# Patient Record
Sex: FEMALE | Race: WHITE | HISPANIC OR LATINO | Employment: OTHER | URBAN - METROPOLITAN AREA
[De-identification: names, ages, dates, MRNs, and addresses within clinical notes are randomized per-mention and may not be internally consistent; named-entity substitution may affect disease eponyms.]

---

## 2017-01-26 ENCOUNTER — TELEPHONE (OUTPATIENT)
Dept: SURGERY | Facility: CLINIC | Age: 82
End: 2017-01-26

## 2017-01-26 NOTE — TELEPHONE ENCOUNTER
----- Message from Bernarda Galarza sent at 1/25/2017 11:30 AM CST -----  Contact: Aditi from [facility] [phone] ext [phone]  Pt states she been vomiting for 2 days, would like to speak with nurse and would like to be seen soon. Please call Aditi

## 2017-01-26 NOTE — TELEPHONE ENCOUNTER
Attempted to reach Aditi at [facility] twice. Messages left on 1/15 & 1/26. No return call.

## 2017-01-27 ENCOUNTER — TELEPHONE (OUTPATIENT)
Dept: SURGERY | Facility: CLINIC | Age: 82
End: 2017-01-27

## 2017-01-27 NOTE — TELEPHONE ENCOUNTER
Returned call to [facility]. Aditi has left for the day. Spoke with Amy Samuels LPN. Amy states she has reviewed the chart and is not sure what Ms. Lal needs. Asked to speak to nursing supervisor-none available.

## 2017-01-27 NOTE — TELEPHONE ENCOUNTER
----- Message from Jackelyn Galindo sent at 1/27/2017  9:44 AM CST -----  Contact: Aditi with [facility]#[phone] ext [phone]  Xray and nothing found but pt is vomiting 2 -3 times a day. She wants to sooner appt than 2/07 which is the first available. Please call

## 2017-10-27 ENCOUNTER — HOSPITAL ENCOUNTER (INPATIENT)
Facility: HOSPITAL | Age: 82
LOS: 4 days | Discharge: NURSING, REHAB OR LONG-TERM CARE | DRG: 304 | End: 2017-10-31
Attending: EMERGENCY MEDICINE | Admitting: INTERNAL MEDICINE
Payer: MEDICARE

## 2017-10-27 DIAGNOSIS — N18.9 ACUTE KIDNEY INJURY SUPERIMPOSED ON CKD: ICD-10-CM

## 2017-10-27 DIAGNOSIS — I16.1 HYPERTENSIVE EMERGENCY: Primary | ICD-10-CM

## 2017-10-27 DIAGNOSIS — I50.9 CHF (CONGESTIVE HEART FAILURE): ICD-10-CM

## 2017-10-27 DIAGNOSIS — N18.5 CKD (CHRONIC KIDNEY DISEASE) STAGE 5, GFR LESS THAN 15 ML/MIN: ICD-10-CM

## 2017-10-27 DIAGNOSIS — N17.9 ACUTE KIDNEY INJURY SUPERIMPOSED ON CKD: ICD-10-CM

## 2017-10-27 DIAGNOSIS — I10 HTN (HYPERTENSION): ICD-10-CM

## 2017-10-27 DIAGNOSIS — R80.8 OTHER PROTEINURIA: ICD-10-CM

## 2017-10-27 PROBLEM — N18.4 CKD STAGE 4 SECONDARY TO HYPERTENSION: Status: ACTIVE | Noted: 2017-10-27

## 2017-10-27 PROBLEM — R79.89 ELEVATED BRAIN NATRIURETIC PEPTIDE (BNP) LEVEL: Status: ACTIVE | Noted: 2017-10-27

## 2017-10-27 PROBLEM — D63.8 ANEMIA OF CHRONIC DISEASE: Status: ACTIVE | Noted: 2017-10-27

## 2017-10-27 PROBLEM — J30.2 SEASONAL ALLERGIES: Status: ACTIVE | Noted: 2017-10-27

## 2017-10-27 PROBLEM — I12.9 CKD STAGE 4 SECONDARY TO HYPERTENSION: Status: ACTIVE | Noted: 2017-10-27

## 2017-10-27 PROBLEM — R32 URINARY INCONTINENCE: Status: ACTIVE | Noted: 2017-10-27

## 2017-10-27 PROBLEM — F32.A DEPRESSION: Status: ACTIVE | Noted: 2017-10-27

## 2017-10-27 PROBLEM — I15.0 RENOVASCULAR HYPERTENSION: Status: ACTIVE | Noted: 2017-10-27

## 2017-10-27 PROBLEM — F03.90 DEMENTIA WITHOUT BEHAVIORAL DISTURBANCE: Status: ACTIVE | Noted: 2017-10-27

## 2017-10-27 PROBLEM — R51.9 HEADACHE: Status: ACTIVE | Noted: 2017-10-27

## 2017-10-27 PROBLEM — Z93.3 COLOSTOMY IN PLACE: Status: ACTIVE | Noted: 2017-10-27

## 2017-10-27 LAB
ALBUMIN SERPL BCP-MCNC: 2.5 G/DL
ALP SERPL-CCNC: 69 U/L
ALT SERPL W/O P-5'-P-CCNC: 8 U/L
ANION GAP SERPL CALC-SCNC: 7 MMOL/L
AST SERPL-CCNC: 14 U/L
BASOPHILS # BLD AUTO: 0.04 K/UL
BASOPHILS NFR BLD: 0.5 %
BILIRUB SERPL-MCNC: 0.3 MG/DL
BNP SERPL-MCNC: 484 PG/ML
BUN SERPL-MCNC: 32 MG/DL
CALCIUM SERPL-MCNC: 8.4 MG/DL
CHLORIDE SERPL-SCNC: 105 MMOL/L
CO2 SERPL-SCNC: 26 MMOL/L
CREAT SERPL-MCNC: 3.3 MG/DL
CREAT UR-MCNC: 41 MG/DL
CREAT UR-MCNC: 41 MG/DL
DIFFERENTIAL METHOD: ABNORMAL
EOSINOPHIL # BLD AUTO: 0.3 K/UL
EOSINOPHIL NFR BLD: 3.3 %
ERYTHROCYTE [DISTWIDTH] IN BLOOD BY AUTOMATED COUNT: 14.6 %
EST. GFR  (AFRICAN AMERICAN): 13.6 ML/MIN/1.73 M^2
EST. GFR  (NON AFRICAN AMERICAN): 11.8 ML/MIN/1.73 M^2
GLUCOSE SERPL-MCNC: 91 MG/DL
HCT VFR BLD AUTO: 37.4 %
HGB BLD-MCNC: 11.6 G/DL
IMM GRANULOCYTES # BLD AUTO: 0.04 K/UL
IMM GRANULOCYTES NFR BLD AUTO: 0.5 %
LYMPHOCYTES # BLD AUTO: 1.5 K/UL
LYMPHOCYTES NFR BLD: 16.6 %
MAGNESIUM SERPL-MCNC: 2.6 MG/DL
MCH RBC QN AUTO: 27.6 PG
MCHC RBC AUTO-ENTMCNC: 31 G/DL
MCV RBC AUTO: 89 FL
MONOCYTES # BLD AUTO: 1.1 K/UL
MONOCYTES NFR BLD: 12.2 %
NEUTROPHILS # BLD AUTO: 5.9 K/UL
NEUTROPHILS NFR BLD: 66.9 %
NRBC BLD-RTO: 0 /100 WBC
PHOSPHATE SERPL-MCNC: 3.9 MG/DL
PLATELET # BLD AUTO: 343 K/UL
PMV BLD AUTO: 10.3 FL
POTASSIUM SERPL-SCNC: 3.8 MMOL/L
PROT SERPL-MCNC: 6.7 G/DL
PROT UR-MCNC: 495 MG/DL
PROT/CREAT RATIO, UR: 12.07
RBC # BLD AUTO: 4.21 M/UL
SODIUM SERPL-SCNC: 138 MMOL/L
SODIUM UR-SCNC: 93 MMOL/L
TROPONIN I SERPL DL<=0.01 NG/ML-MCNC: 0.02 NG/ML
TROPONIN I SERPL DL<=0.01 NG/ML-MCNC: 0.03 NG/ML
UUN UR-MCNC: 249 MG/DL
WBC # BLD AUTO: 8.86 K/UL

## 2017-10-27 PROCEDURE — 20000000 HC ICU ROOM

## 2017-10-27 PROCEDURE — 84300 ASSAY OF URINE SODIUM: CPT

## 2017-10-27 PROCEDURE — 25000003 PHARM REV CODE 250: Performed by: STUDENT IN AN ORGANIZED HEALTH CARE EDUCATION/TRAINING PROGRAM

## 2017-10-27 PROCEDURE — 63600175 PHARM REV CODE 636 W HCPCS: Performed by: STUDENT IN AN ORGANIZED HEALTH CARE EDUCATION/TRAINING PROGRAM

## 2017-10-27 PROCEDURE — 84100 ASSAY OF PHOSPHORUS: CPT

## 2017-10-27 PROCEDURE — 93005 ELECTROCARDIOGRAM TRACING: CPT

## 2017-10-27 PROCEDURE — 25000003 PHARM REV CODE 250: Performed by: INTERNAL MEDICINE

## 2017-10-27 PROCEDURE — 63600175 PHARM REV CODE 636 W HCPCS: Performed by: INTERNAL MEDICINE

## 2017-10-27 PROCEDURE — 99285 EMERGENCY DEPT VISIT HI MDM: CPT | Mod: 25

## 2017-10-27 PROCEDURE — 83880 ASSAY OF NATRIURETIC PEPTIDE: CPT

## 2017-10-27 PROCEDURE — 80053 COMPREHEN METABOLIC PANEL: CPT

## 2017-10-27 PROCEDURE — 93010 ELECTROCARDIOGRAM REPORT: CPT | Mod: ,,, | Performed by: INTERNAL MEDICINE

## 2017-10-27 PROCEDURE — 83735 ASSAY OF MAGNESIUM: CPT

## 2017-10-27 PROCEDURE — 99223 1ST HOSP IP/OBS HIGH 75: CPT | Mod: AI,GC,, | Performed by: INTERNAL MEDICINE

## 2017-10-27 PROCEDURE — 96366 THER/PROPH/DIAG IV INF ADDON: CPT

## 2017-10-27 PROCEDURE — 84156 ASSAY OF PROTEIN URINE: CPT

## 2017-10-27 PROCEDURE — 99285 EMERGENCY DEPT VISIT HI MDM: CPT | Mod: ,,, | Performed by: EMERGENCY MEDICINE

## 2017-10-27 PROCEDURE — 84540 ASSAY OF URINE/UREA-N: CPT

## 2017-10-27 PROCEDURE — 96375 TX/PRO/DX INJ NEW DRUG ADDON: CPT

## 2017-10-27 PROCEDURE — 12000002 HC ACUTE/MED SURGE SEMI-PRIVATE ROOM

## 2017-10-27 PROCEDURE — 96365 THER/PROPH/DIAG IV INF INIT: CPT

## 2017-10-27 PROCEDURE — 85025 COMPLETE CBC W/AUTO DIFF WBC: CPT

## 2017-10-27 PROCEDURE — 84484 ASSAY OF TROPONIN QUANT: CPT | Mod: 91

## 2017-10-27 PROCEDURE — 96372 THER/PROPH/DIAG INJ SC/IM: CPT

## 2017-10-27 PROCEDURE — 84484 ASSAY OF TROPONIN QUANT: CPT

## 2017-10-27 RX ORDER — CALCITRIOL 0.25 UG/1
0.5 CAPSULE ORAL DAILY
Status: DISCONTINUED | OUTPATIENT
Start: 2017-10-28 | End: 2017-10-31 | Stop reason: HOSPADM

## 2017-10-27 RX ORDER — FOLIC ACID 1 MG/1
1 TABLET ORAL DAILY
COMMUNITY

## 2017-10-27 RX ORDER — DONEPEZIL HYDROCHLORIDE 10 MG/1
10 TABLET, FILM COATED ORAL NIGHTLY
COMMUNITY

## 2017-10-27 RX ORDER — NEBIVOLOL 10 MG/1
10 TABLET ORAL DAILY
Status: DISCONTINUED | OUTPATIENT
Start: 2017-10-28 | End: 2017-10-28

## 2017-10-27 RX ORDER — TORSEMIDE 20 MG/1
20 TABLET ORAL 2 TIMES DAILY
Status: DISCONTINUED | OUTPATIENT
Start: 2017-10-27 | End: 2017-10-27

## 2017-10-27 RX ORDER — HEPARIN SODIUM 5000 [USP'U]/ML
5000 INJECTION, SOLUTION INTRAVENOUS; SUBCUTANEOUS EVERY 8 HOURS
Status: DISCONTINUED | OUTPATIENT
Start: 2017-10-27 | End: 2017-10-31 | Stop reason: HOSPADM

## 2017-10-27 RX ORDER — MECLIZINE HCL 12.5 MG 12.5 MG/1
12.5 TABLET ORAL 3 TIMES DAILY PRN
Status: DISCONTINUED | OUTPATIENT
Start: 2017-10-27 | End: 2017-10-31 | Stop reason: HOSPADM

## 2017-10-27 RX ORDER — SODIUM CHLORIDE 0.9 % (FLUSH) 0.9 %
3 SYRINGE (ML) INJECTION EVERY 8 HOURS
Status: DISCONTINUED | OUTPATIENT
Start: 2017-10-27 | End: 2017-10-31 | Stop reason: HOSPADM

## 2017-10-27 RX ORDER — POTASSIUM CHLORIDE 20 MEQ/15ML
60 SOLUTION ORAL
Status: DISCONTINUED | OUTPATIENT
Start: 2017-10-27 | End: 2017-10-28

## 2017-10-27 RX ORDER — MECLIZINE HYDROCHLORIDE CHEWABLE TABLETS 25 MG/1
12.5 TABLET, CHEWABLE ORAL 3 TIMES DAILY PRN
Status: DISCONTINUED | OUTPATIENT
Start: 2017-10-27 | End: 2017-10-27

## 2017-10-27 RX ORDER — NICARDIPINE HYDROCHLORIDE 0.2 MG/ML
5 INJECTION INTRAVENOUS CONTINUOUS
Status: DISCONTINUED | OUTPATIENT
Start: 2017-10-27 | End: 2017-10-28

## 2017-10-27 RX ORDER — TORSEMIDE 20 MG/1
20 TABLET ORAL DAILY
Status: ON HOLD | COMMUNITY
End: 2017-10-31 | Stop reason: HOSPADM

## 2017-10-27 RX ORDER — AMLODIPINE BESYLATE 5 MG/1
5 TABLET ORAL DAILY
Status: DISCONTINUED | OUTPATIENT
Start: 2017-10-27 | End: 2017-10-28

## 2017-10-27 RX ORDER — ONDANSETRON 2 MG/ML
4 INJECTION INTRAMUSCULAR; INTRAVENOUS
Status: COMPLETED | OUTPATIENT
Start: 2017-10-27 | End: 2017-10-27

## 2017-10-27 RX ORDER — HYDRALAZINE HYDROCHLORIDE 25 MG/1
50 TABLET, FILM COATED ORAL ONCE
Status: COMPLETED | OUTPATIENT
Start: 2017-10-27 | End: 2017-10-27

## 2017-10-27 RX ORDER — IPRATROPIUM BROMIDE AND ALBUTEROL SULFATE 2.5; .5 MG/3ML; MG/3ML
3 SOLUTION RESPIRATORY (INHALATION) EVERY 4 HOURS PRN
Status: DISCONTINUED | OUTPATIENT
Start: 2017-10-27 | End: 2017-10-31 | Stop reason: HOSPADM

## 2017-10-27 RX ORDER — FERROUS GLUCONATE 324(38)MG
324 TABLET ORAL
Status: DISCONTINUED | OUTPATIENT
Start: 2017-10-28 | End: 2017-10-31 | Stop reason: HOSPADM

## 2017-10-27 RX ORDER — DONEPEZIL HYDROCHLORIDE 5 MG/1
10 TABLET, FILM COATED ORAL NIGHTLY
Status: DISCONTINUED | OUTPATIENT
Start: 2017-10-27 | End: 2017-10-31 | Stop reason: HOSPADM

## 2017-10-27 RX ORDER — TRIAMCINOLONE ACETONIDE 1 MG/G
CREAM TOPICAL 2 TIMES DAILY
COMMUNITY

## 2017-10-27 RX ORDER — NEBIVOLOL 10 MG/1
10 TABLET ORAL DAILY
Status: DISCONTINUED | OUTPATIENT
Start: 2017-10-28 | End: 2017-10-27

## 2017-10-27 RX ORDER — HYDRALAZINE HYDROCHLORIDE 50 MG/1
100 TABLET, FILM COATED ORAL EVERY 8 HOURS
Status: DISCONTINUED | OUTPATIENT
Start: 2017-10-27 | End: 2017-10-28

## 2017-10-27 RX ORDER — ASCORBIC ACID 500 MG
500 TABLET ORAL DAILY
Status: DISCONTINUED | OUTPATIENT
Start: 2017-10-28 | End: 2017-10-31 | Stop reason: HOSPADM

## 2017-10-27 RX ORDER — TRAMADOL HYDROCHLORIDE 50 MG/1
50 TABLET ORAL EVERY 6 HOURS PRN
Status: DISCONTINUED | OUTPATIENT
Start: 2017-10-27 | End: 2017-10-31 | Stop reason: HOSPADM

## 2017-10-27 RX ORDER — MIRTAZAPINE 7.5 MG/1
7.5 TABLET, FILM COATED ORAL NIGHTLY
COMMUNITY

## 2017-10-27 RX ORDER — FOLIC ACID 1 MG/1
1 TABLET ORAL DAILY
Status: DISCONTINUED | OUTPATIENT
Start: 2017-10-28 | End: 2017-10-31 | Stop reason: HOSPADM

## 2017-10-27 RX ORDER — ACETAMINOPHEN 325 MG/1
650 TABLET ORAL EVERY 4 HOURS PRN
Status: DISCONTINUED | OUTPATIENT
Start: 2017-10-27 | End: 2017-10-31 | Stop reason: HOSPADM

## 2017-10-27 RX ORDER — ERGOCALCIFEROL 1.25 MG/1
50000 CAPSULE ORAL
Status: DISCONTINUED | OUTPATIENT
Start: 2017-10-28 | End: 2017-10-31 | Stop reason: HOSPADM

## 2017-10-27 RX ORDER — POTASSIUM CHLORIDE 20 MEQ/15ML
40 SOLUTION ORAL
Status: DISCONTINUED | OUTPATIENT
Start: 2017-10-27 | End: 2017-10-28

## 2017-10-27 RX ORDER — FLUTICASONE PROPIONATE 50 MCG
1 SPRAY, SUSPENSION (ML) NASAL DAILY
Status: DISCONTINUED | OUTPATIENT
Start: 2017-10-28 | End: 2017-10-29

## 2017-10-27 RX ORDER — AMLODIPINE BESYLATE 10 MG/1
10 TABLET ORAL DAILY
Status: DISCONTINUED | OUTPATIENT
Start: 2017-10-27 | End: 2017-10-27

## 2017-10-27 RX ORDER — SODIUM,POTASSIUM PHOSPHATES 280-250MG
2 POWDER IN PACKET (EA) ORAL
Status: DISCONTINUED | OUTPATIENT
Start: 2017-10-27 | End: 2017-10-28

## 2017-10-27 RX ORDER — HYDRALAZINE HYDROCHLORIDE 50 MG/1
50 TABLET, FILM COATED ORAL EVERY 6 HOURS
COMMUNITY

## 2017-10-27 RX ORDER — MAGNESIUM SULFATE HEPTAHYDRATE 40 MG/ML
4 INJECTION, SOLUTION INTRAVENOUS
Status: DISCONTINUED | OUTPATIENT
Start: 2017-10-27 | End: 2017-10-31 | Stop reason: HOSPADM

## 2017-10-27 RX ORDER — MAGNESIUM SULFATE HEPTAHYDRATE 40 MG/ML
2 INJECTION, SOLUTION INTRAVENOUS
Status: DISCONTINUED | OUTPATIENT
Start: 2017-10-27 | End: 2017-10-31 | Stop reason: HOSPADM

## 2017-10-27 RX ORDER — NEBIVOLOL 10 MG/1
10 TABLET ORAL
Status: DISCONTINUED | OUTPATIENT
Start: 2017-10-27 | End: 2017-10-27

## 2017-10-27 RX ORDER — MIRTAZAPINE 15 MG/1
15 TABLET, FILM COATED ORAL NIGHTLY
Status: DISCONTINUED | OUTPATIENT
Start: 2017-10-27 | End: 2017-10-31 | Stop reason: HOSPADM

## 2017-10-27 RX ORDER — ONDANSETRON 2 MG/ML
8 INJECTION INTRAMUSCULAR; INTRAVENOUS EVERY 8 HOURS PRN
Status: DISCONTINUED | OUTPATIENT
Start: 2017-10-27 | End: 2017-10-31 | Stop reason: HOSPADM

## 2017-10-27 RX ORDER — DICYCLOMINE HYDROCHLORIDE 10 MG/1
10 CAPSULE ORAL
Status: DISCONTINUED | OUTPATIENT
Start: 2017-10-28 | End: 2017-10-29

## 2017-10-27 RX ORDER — CITALOPRAM 10 MG/1
10 TABLET ORAL DAILY
Status: DISCONTINUED | OUTPATIENT
Start: 2017-10-28 | End: 2017-10-29

## 2017-10-27 RX ORDER — SOLIFENACIN SUCCINATE 10 MG/1
10 TABLET, FILM COATED ORAL DAILY
Status: DISCONTINUED | OUTPATIENT
Start: 2017-10-28 | End: 2017-10-29

## 2017-10-27 RX ORDER — TORSEMIDE 20 MG/1
20 TABLET ORAL DAILY
Status: DISCONTINUED | OUTPATIENT
Start: 2017-10-28 | End: 2017-10-29

## 2017-10-27 RX ORDER — CETIRIZINE HYDROCHLORIDE 5 MG/1
10 TABLET ORAL DAILY
Status: DISCONTINUED | OUTPATIENT
Start: 2017-10-28 | End: 2017-10-29

## 2017-10-27 RX ORDER — TRAMADOL HYDROCHLORIDE 50 MG/1
50 TABLET ORAL EVERY 4 HOURS PRN
Status: ON HOLD | COMMUNITY
End: 2017-10-31

## 2017-10-27 RX ADMIN — NICARDIPINE HYDROCHLORIDE 5 MG/HR: 0.2 INJECTION, SOLUTION INTRAVENOUS at 06:10

## 2017-10-27 RX ADMIN — DONEPEZIL HYDROCHLORIDE 10 MG: 10 TABLET, FILM COATED ORAL at 11:10

## 2017-10-27 RX ADMIN — AMLODIPINE BESYLATE 5 MG: 5 TABLET ORAL at 11:10

## 2017-10-27 RX ADMIN — HEPARIN SODIUM 5000 UNITS: 5000 INJECTION, SOLUTION INTRAVENOUS; SUBCUTANEOUS at 11:10

## 2017-10-27 RX ADMIN — HYDRALAZINE HYDROCHLORIDE 50 MG: 25 TABLET, FILM COATED ORAL at 02:10

## 2017-10-27 RX ADMIN — ONDANSETRON 4 MG: 2 INJECTION INTRAMUSCULAR; INTRAVENOUS at 03:10

## 2017-10-27 RX ADMIN — HYDRALAZINE HYDROCHLORIDE 100 MG: 50 TABLET ORAL at 11:10

## 2017-10-27 RX ADMIN — MIRTAZAPINE 15 MG: 15 TABLET, FILM COATED ORAL at 11:10

## 2017-10-27 RX ADMIN — NICARDIPINE HYDROCHLORIDE 5 MG/HR: 0.2 INJECTION, SOLUTION INTRAVENOUS at 04:10

## 2017-10-27 NOTE — ED NOTES
Pt. Resting in bed in no acute distress. Respiration rate even and unlabored. Continuous pulse ox. Explanation of care/wait provided. Bed in low, locked position with rails up and call bell in reach. Will continue to monitor.

## 2017-10-27 NOTE — ED PROVIDER NOTES
"Encounter Date: 10/27/2017       History     Chief Complaint   Patient presents with    Hypertension    Headache     Ms. Elly Lal is a 88 yo female with a PMHx of Anemia, GERD, HTN, Bowel obstruction s/p colectomy with colectomy bag, depression presents with a 2-3 day history of occipital headaches and hypertension. Ms. Lal is a Kazakh only speaking female and history was obtained using a . She reports having a band-like headache for the past 2-3 days where he wraps from the base of her head to the temples. She describes it as a "pinching" type pain which improves only with Vics rub and rest. It is not worsened with activity or bright lights. She also reports having an elevated blood pressure during this time but does not know how high. She denies CP, SOB, n/v, palpitations, diarrhea, vision changes or loss of consciousness. She reports she has been taking her blood pressure medications regularly and took her medications this morning.           Review of patient's allergies indicates:  No Known Allergies  Past Medical History:   Diagnosis Date    Anemia     Arthritis     Bowel obstruction 2011    Depression     GERD (gastroesophageal reflux disease)     Hypertension     Seasonal allergies      Past Surgical History:   Procedure Laterality Date    COLON SURGERY      alberto's, with hysterectomy    COLOSTOMY       Family History   Problem Relation Age of Onset    Dementia Neg Hx      Social History   Substance Use Topics    Smoking status: Never Smoker    Smokeless tobacco: Not on file    Alcohol use No     Review of Systems   Constitutional: Negative for activity change, chills and fever.   HENT: Negative for congestion, postnasal drip, rhinorrhea and sinus pressure.    Eyes: Negative for discharge, redness and visual disturbance.   Respiratory: Negative for cough and shortness of breath.    Cardiovascular: Negative for chest pain and palpitations.   Gastrointestinal: Negative for " abdominal distention, abdominal pain, constipation, diarrhea, nausea and vomiting.   Endocrine: Negative for cold intolerance and heat intolerance.   Genitourinary: Negative for dysuria.   Musculoskeletal: Negative for arthralgias and back pain.   Skin: Negative for color change and pallor.   Neurological: Positive for headaches. Negative for dizziness, seizures and speech difficulty.   Psychiatric/Behavioral: Negative for agitation, sleep disturbance and suicidal ideas. The patient is not nervous/anxious.        Physical Exam     Initial Vitals [10/27/17 1217]   BP Pulse Resp Temp SpO2   (!) 220/90 60 18 98.7 °F (37.1 °C) 98 %      MAP       133.33         Physical Exam    Constitutional: She appears well-developed and well-nourished.   HENT:   Head: Normocephalic and atraumatic.   Eyes: Conjunctivae and EOM are normal. Pupils are equal, round, and reactive to light.   Neck: Normal range of motion. Neck supple.   Cardiovascular: Normal rate and regular rhythm.   No murmur heard.  Pulmonary/Chest: Breath sounds normal. No respiratory distress. She has no wheezes.   Abdominal: Soft. Bowel sounds are normal.   There is a colostomy bag in place which is clean without erythema or edema.      Musculoskeletal: Normal range of motion. She exhibits no edema or tenderness.   Neurological: She is alert.   Patient is oriented to time and place but not person.    Skin: Skin is warm and dry. Capillary refill takes less than 2 seconds.       ED Course   Procedures  Labs Reviewed   COMPREHENSIVE METABOLIC PANEL   CBC W/ AUTO DIFFERENTIAL   MAGNESIUM   PHOSPHORUS   TROPONIN I   B-TYPE NATRIURETIC PEPTIDE             Medical Decision Making:   Clinical Tests:   Lab Tests: Ordered  Medical Tests: Ordered  ED Management:  Ms. Lal is a 88 yo female presenting with a 2-3 day history of HTN and headaches. Differential diagnosis include but are not limited to renal artery stenosis, medication non-compliance, stress, anxiety and stroke.  In the ED, labs ordered include CBC, BMP, Mg, Phos, BNP and troponin. On exam, she was found to have a BP of 230/80. A dose of hydralazine 50mg was administered and her BP decreased to 220/80. On CMP, her Cr was found to be significantly elevated from baseline.     Reassessment: Her CMP revealed an elevated Cr compared to her baseline. Her blood pressure remained elevated at 215/90. She was started on Nifedipine drip. BP improved to 175. Her drip was stopped and her bp increased back to 203. Critical care was consulted.               Attending Attestation:   Physician Attestation Statement for Resident:  As the supervising MD  I agree with the above history. -:   As the supervising MD I agree with the above PE.    As the supervising MD I agree with the above treatment, course, plan, and disposition.  I have reviewed and agree with the residents interpretation of the following: lab data and EKG.                    ED Course      Clinical Impression:   The primary encounter diagnosis was Hypertensive emergency. A diagnosis of HTN (hypertension) was also pertinent to this visit.    Disposition:   Disposition: Admitted  Condition: Stable                        Alessio Oconnor MD  12/20/17 7543

## 2017-10-27 NOTE — ED NOTES
LOC: The patient is awake, alert, and oriented to place, time, situation. Affect is appropriated.  Speech is appropriate and clear.     APPEARANCE: Patient resting comfortably in no acute distress.  Patient is clean and well groomed.    SKIN: The skin is warm and dry; color consistent with ethnicity.  Patient has normal skin turgor and moist mucus membranes.  Skin intact; no breakdown or bruising noted.     MUSCULOSKELETAL: Patient moving upper and lower extremities without difficulty.  Denies weakness.     RESPIRATORY: Airway is open and patent. Lungs clear to auscultation in all lobes. Respirations spontaneous, even, easy, and non-labored.  Patient has a normal effort and rate.  No accessory muscle use noted. Denies cough.     CARDIAC:  Normal rhythm and rate noted.  Elevated B/P.  No peripheral edema noted.  Capillary refill < 3 seconds.     ABDOMEN: Soft and non tender to palpation.  No distention noted. Bowel sounds present.  Colostomy present.     NEUROLOGIC: eyes open spontaneously.  Behavior appropriate to situation.  Follows commands; facial expression symmetrical; equal hand grasps bilaterally.  Purposeful motor response noted.

## 2017-10-27 NOTE — ED TRIAGE NOTES
Pt. Comes to the ED via EMS from [facility] in [place].  Complaints of hypertension and headache.  Pt. Is Zambian speaking, does not speak english. The [employer] is being used to translate. Pt. Reports having 3 different surgeries, one being on the colon.

## 2017-10-27 NOTE — ED NOTES
Significant decrease in BP noted, no change in nicardipine drip rate at this time.  Pt. Denies headache or dizziness. Will continue to monitor.

## 2017-10-27 NOTE — PROVIDER PROGRESS NOTES - EMERGENCY DEPT.
Encounter Date: 10/27/2017    ED Physician Progress Notes         EKG - STEMI Decision  Initial Reading: No STEMI present.

## 2017-10-28 PROBLEM — N18.5 CKD (CHRONIC KIDNEY DISEASE) STAGE 5, GFR LESS THAN 15 ML/MIN: Status: ACTIVE | Noted: 2017-10-27

## 2017-10-28 LAB
ALBUMIN SERPL BCP-MCNC: 2.2 G/DL
ALP SERPL-CCNC: 56 U/L
ALT SERPL W/O P-5'-P-CCNC: 6 U/L
ANION GAP SERPL CALC-SCNC: 10 MMOL/L
AST SERPL-CCNC: 14 U/L
BASOPHILS # BLD AUTO: 0.02 K/UL
BASOPHILS NFR BLD: 0.2 %
BILIRUB SERPL-MCNC: 0.3 MG/DL
BUN SERPL-MCNC: 30 MG/DL
CALCIUM SERPL-MCNC: 8.3 MG/DL
CHLORIDE SERPL-SCNC: 106 MMOL/L
CO2 SERPL-SCNC: 22 MMOL/L
CREAT SERPL-MCNC: 3 MG/DL
DIFFERENTIAL METHOD: ABNORMAL
EOSINOPHIL # BLD AUTO: 0.4 K/UL
EOSINOPHIL NFR BLD: 4 %
ERYTHROCYTE [DISTWIDTH] IN BLOOD BY AUTOMATED COUNT: 14.5 %
EST. GFR  (AFRICAN AMERICAN): 15.3 ML/MIN/1.73 M^2
EST. GFR  (NON AFRICAN AMERICAN): 13.3 ML/MIN/1.73 M^2
GLUCOSE SERPL-MCNC: 86 MG/DL
HCT VFR BLD AUTO: 35.4 %
HGB BLD-MCNC: 11.1 G/DL
IMM GRANULOCYTES # BLD AUTO: 0.03 K/UL
IMM GRANULOCYTES NFR BLD AUTO: 0.3 %
LYMPHOCYTES # BLD AUTO: 1.2 K/UL
LYMPHOCYTES NFR BLD: 13.5 %
MAGNESIUM SERPL-MCNC: 2.1 MG/DL
MCH RBC QN AUTO: 27.4 PG
MCHC RBC AUTO-ENTMCNC: 31.4 G/DL
MCV RBC AUTO: 87 FL
MONOCYTES # BLD AUTO: 0.9 K/UL
MONOCYTES NFR BLD: 9.7 %
NEUTROPHILS # BLD AUTO: 6.3 K/UL
NEUTROPHILS NFR BLD: 72.3 %
NRBC BLD-RTO: 0 /100 WBC
PHOSPHATE SERPL-MCNC: 3.9 MG/DL
PLATELET # BLD AUTO: 311 K/UL
PMV BLD AUTO: 9.9 FL
POCT GLUCOSE: 110 MG/DL (ref 70–110)
POTASSIUM SERPL-SCNC: 3.7 MMOL/L
PROT SERPL-MCNC: 5.9 G/DL
RBC # BLD AUTO: 4.05 M/UL
SODIUM SERPL-SCNC: 138 MMOL/L
WBC # BLD AUTO: 8.76 K/UL

## 2017-10-28 PROCEDURE — A4216 STERILE WATER/SALINE, 10 ML: HCPCS | Performed by: INTERNAL MEDICINE

## 2017-10-28 PROCEDURE — 83735 ASSAY OF MAGNESIUM: CPT

## 2017-10-28 PROCEDURE — 63600175 PHARM REV CODE 636 W HCPCS: Performed by: INTERNAL MEDICINE

## 2017-10-28 PROCEDURE — 93306 TTE W/DOPPLER COMPLETE: CPT

## 2017-10-28 PROCEDURE — 85025 COMPLETE CBC W/AUTO DIFF WBC: CPT

## 2017-10-28 PROCEDURE — 25000242 PHARM REV CODE 250 ALT 637 W/ HCPCS: Performed by: INTERNAL MEDICINE

## 2017-10-28 PROCEDURE — 25000003 PHARM REV CODE 250: Performed by: STUDENT IN AN ORGANIZED HEALTH CARE EDUCATION/TRAINING PROGRAM

## 2017-10-28 PROCEDURE — 93306 TTE W/DOPPLER COMPLETE: CPT | Mod: 26,,, | Performed by: INTERNAL MEDICINE

## 2017-10-28 PROCEDURE — 84100 ASSAY OF PHOSPHORUS: CPT

## 2017-10-28 PROCEDURE — 25000003 PHARM REV CODE 250: Performed by: INTERNAL MEDICINE

## 2017-10-28 PROCEDURE — 20600001 HC STEP DOWN PRIVATE ROOM

## 2017-10-28 PROCEDURE — 80053 COMPREHEN METABOLIC PANEL: CPT

## 2017-10-28 RX ORDER — HYDRALAZINE HYDROCHLORIDE 50 MG/1
50 TABLET, FILM COATED ORAL EVERY 6 HOURS
Status: DISCONTINUED | OUTPATIENT
Start: 2017-10-28 | End: 2017-10-29

## 2017-10-28 RX ORDER — LISINOPRIL 20 MG/1
20 TABLET ORAL DAILY
Status: DISCONTINUED | OUTPATIENT
Start: 2017-10-29 | End: 2017-10-28

## 2017-10-28 RX ORDER — AMLODIPINE BESYLATE 10 MG/1
10 TABLET ORAL DAILY
Status: DISCONTINUED | OUTPATIENT
Start: 2017-10-29 | End: 2017-10-31 | Stop reason: HOSPADM

## 2017-10-28 RX ORDER — CARVEDILOL 12.5 MG/1
12.5 TABLET ORAL 2 TIMES DAILY
Status: DISCONTINUED | OUTPATIENT
Start: 2017-10-28 | End: 2017-10-28

## 2017-10-28 RX ORDER — ATROPINE SULFATE 0.1 MG/ML
INJECTION INTRAVENOUS
Status: DISPENSED
Start: 2017-10-28 | End: 2017-10-28

## 2017-10-28 RX ORDER — AMLODIPINE BESYLATE 5 MG/1
5 TABLET ORAL ONCE
Status: COMPLETED | OUTPATIENT
Start: 2017-10-28 | End: 2017-10-28

## 2017-10-28 RX ORDER — NEBIVOLOL 10 MG/1
20 TABLET ORAL DAILY
Status: DISCONTINUED | OUTPATIENT
Start: 2017-10-29 | End: 2017-10-28

## 2017-10-28 RX ORDER — CARVEDILOL 6.25 MG/1
6.25 TABLET ORAL 2 TIMES DAILY
Status: DISCONTINUED | OUTPATIENT
Start: 2017-10-28 | End: 2017-10-31

## 2017-10-28 RX ADMIN — FLUTICASONE PROPIONATE 1 SPRAY: 50 SPRAY, METERED NASAL at 09:10

## 2017-10-28 RX ADMIN — HEPARIN SODIUM 5000 UNITS: 5000 INJECTION, SOLUTION INTRAVENOUS; SUBCUTANEOUS at 09:10

## 2017-10-28 RX ADMIN — FERROUS GLUCONATE TAB 324 MG (37.5 MG ELEMENTAL IRON) 324 MG: 324 (37.5 FE) TAB at 11:10

## 2017-10-28 RX ADMIN — AMLODIPINE BESYLATE 5 MG: 5 TABLET ORAL at 07:10

## 2017-10-28 RX ADMIN — HEPARIN SODIUM 5000 UNITS: 5000 INJECTION, SOLUTION INTRAVENOUS; SUBCUTANEOUS at 02:10

## 2017-10-28 RX ADMIN — SOLIFENACIN SUCCINATE 10 MG: 10 TABLET, FILM COATED ORAL at 08:10

## 2017-10-28 RX ADMIN — MIRTAZAPINE 15 MG: 15 TABLET, FILM COATED ORAL at 09:10

## 2017-10-28 RX ADMIN — DICYCLOMINE HYDROCHLORIDE 10 MG: 10 CAPSULE ORAL at 03:10

## 2017-10-28 RX ADMIN — Medication 3 ML: at 09:10

## 2017-10-28 RX ADMIN — DICYCLOMINE HYDROCHLORIDE 10 MG: 10 CAPSULE ORAL at 09:10

## 2017-10-28 RX ADMIN — FERROUS GLUCONATE TAB 324 MG (37.5 MG ELEMENTAL IRON) 324 MG: 324 (37.5 FE) TAB at 04:10

## 2017-10-28 RX ADMIN — DONEPEZIL HYDROCHLORIDE 10 MG: 10 TABLET, FILM COATED ORAL at 09:10

## 2017-10-28 RX ADMIN — CARVEDILOL 6.25 MG: 6.25 TABLET, FILM COATED ORAL at 09:10

## 2017-10-28 RX ADMIN — CITALOPRAM HYDROBROMIDE 10 MG: 10 TABLET ORAL at 08:10

## 2017-10-28 RX ADMIN — HYDRALAZINE HYDROCHLORIDE 50 MG: 50 TABLET ORAL at 05:10

## 2017-10-28 RX ADMIN — HYDRALAZINE HYDROCHLORIDE 100 MG: 50 TABLET ORAL at 05:10

## 2017-10-28 RX ADMIN — CALCITRIOL 0.5 MCG: 0.25 CAPSULE, LIQUID FILLED ORAL at 09:10

## 2017-10-28 RX ADMIN — TORSEMIDE 20 MG: 20 TABLET ORAL at 08:10

## 2017-10-28 RX ADMIN — DICYCLOMINE HYDROCHLORIDE 10 MG: 10 CAPSULE ORAL at 11:10

## 2017-10-28 RX ADMIN — Medication 500 MG: at 08:10

## 2017-10-28 RX ADMIN — FERROUS GLUCONATE TAB 324 MG (37.5 MG ELEMENTAL IRON) 324 MG: 324 (37.5 FE) TAB at 08:10

## 2017-10-28 RX ADMIN — HYDRALAZINE HYDROCHLORIDE 50 MG: 50 TABLET ORAL at 11:10

## 2017-10-28 RX ADMIN — DICYCLOMINE HYDROCHLORIDE 10 MG: 10 CAPSULE ORAL at 05:10

## 2017-10-28 RX ADMIN — AMLODIPINE BESYLATE 5 MG: 5 TABLET ORAL at 09:10

## 2017-10-28 RX ADMIN — CARVEDILOL 12.5 MG: 12.5 TABLET, FILM COATED ORAL at 09:10

## 2017-10-28 RX ADMIN — ERGOCALCIFEROL 50000 UNITS: 1.25 CAPSULE ORAL at 08:10

## 2017-10-28 RX ADMIN — Medication 3 ML: at 02:10

## 2017-10-28 RX ADMIN — HEPARIN SODIUM 5000 UNITS: 5000 INJECTION, SOLUTION INTRAVENOUS; SUBCUTANEOUS at 05:10

## 2017-10-28 RX ADMIN — NEBIVOLOL HYDROCHLORIDE 10 MG: 10 TABLET ORAL at 08:10

## 2017-10-28 RX ADMIN — FOLIC ACID 1 MG: 1 TABLET ORAL at 08:10

## 2017-10-28 RX ADMIN — Medication 3 ML: at 06:10

## 2017-10-28 RX ADMIN — CETIRIZINE HYDROCHLORIDE 10 MG: 5 TABLET, FILM COATED ORAL at 08:10

## 2017-10-28 NOTE — ASSESSMENT & PLAN NOTE
-eGFR < 20.  -Continue home ergocalciferol.  -Continue home calcitriol.  -Renally dose meds.  -Phosphorus labs daily. Supplement PRN.  -Magnesium labs daily. Supplement PRN for goal > 2.0.

## 2017-10-28 NOTE — HPI
"89 F with HTN and CKD4 is transferred from nursing home on 10/27/17 for worsening HTN. History obtained from patient and family members.    Per family, her baseline BP is "normal" (120s-130s). For the past 2 weeks, patient has been having SBP in the 170s (early in the morning and throughout the day). A few days ago, SBP went up to the 190s, and her hydralazine 50 mg was increased from TID to QID 3 days ago. This morning, her SBP was 220. She resides in a nursing home and is compliant with her outpatient medication regimen, which includes hydralazine 50 mg QID, nebivolol 10 mg daily, and torsemide 20 daily, all most recently taken this morning prior to presentation. Reports eating salty foods. Per family, has had decreased PO intake for the past 2 weeks because she does not like the food at the nursing home. She has been seeing her outside doctor for HTN and kidney disease (unclear specific diagnosis) for the past few months. Reports urinary frequency. Denies decreased UOP, blood in urine, pain with urination.    She reports headache associated with her HTN for the past 2 week, described as a band wrapped around her head from the lower occipital area to forehead bilaterally. HA occurs 1-2 times daily, worst in the mornings and worse with any kind of movement/sitting upright, improved with lying down/still. No hx of chronic headaches or migraines. Associated sensation of room spinning. Denies n/v, light/sound sensitivity, vision changes, focal weakness, abnormal sensation, neck pain/stiffness, lightheadedness, syncope/LOC, fall. Per family, had delirium "was talking nonsense" a few days ago when she had an episode of UTI, which has since resolved. Has dementia at baseline (alzheimer's vs LBD, per neuro). Family denies acute changes in mental status.    ROS notable for abdominal distension, LLQ abdominal pain upon touch, and R sided torso pain upon touch. Has chronic back pain. Denies leg swelling, flank pain, " diarrhea, blood in stool, flu-like sx.

## 2017-10-28 NOTE — RESIDENT HANDOFF
"Handoff     Primary Team: Networked reference to record PCT  Room Number: 6077/6077 A     Patient Name: Elly Lal MRN: [id number]     Date of Birth: [id number] Allergies: Patient has no known allergies.     Age: 89 y.o. Admit Date: 10/27/2017     Sex: female  BMI: Body mass index is 27.58 kg/m².     Code Status: DNR        Illness Level (current clinical status): Watcher - No    Reason for Admission: <principal problem not specified>    Brief HPI (pertinent PMH and diagnosis or differential diagnosis): 89 F with HTN and CKD4 is transferred from nursing home on 10/27/17 for worsening HTN. History obtained from patient and family members.     Per family, her baseline BP is "normal" (120s-130s). For the past 2 weeks, patient has been having SBP in the 170s (early in the morning and throughout the day). A few days ago, SBP went up to the 190s, and her hydralazine 50 mg was increased from TID to QID 3 days ago. This morning, her SBP was 220. She resides in a nursing home and is compliant with her outpatient medication regimen, which includes hydralazine 50 mg QID, nebivolol 10 mg daily, and torsemide 20 daily, all most recently taken this morning prior to presentation. Reports eating salty foods. Per family, has had decreased PO intake for the past 2 weeks because she does not like the food at the nursing home. She has been seeing her outside doctor for HTN and kidney disease (unclear specific diagnosis) for the past few months. Reports urinary frequency. Denies decreased UOP, blood in urine, pain with urination.     She reports headache associated with her HTN for the past 2 week, described as a band wrapped around her head from the lower occipital area to forehead bilaterally. HA occurs 1-2 times daily, worst in the mornings and worse with any kind of movement/sitting upright, improved with lying down/still. No hx of chronic headaches or migraines. Associated sensation of room spinning. Denies n/v, light/sound sensitivity, " "vision changes, focal weakness, abnormal sensation, neck pain/stiffness, lightheadedness, syncope/LOC, fall. Per family, had delirium "was talking nonsense" a few days ago when she had an episode of UTI, which has since resolved. Has dementia at baseline (alzheimer's vs LBD, per neuro). Family denies acute changes in mental status.     ROS notable for abdominal distension, LLQ abdominal pain upon touch, and R sided torso pain upon touch. Has chronic back pain. Denies leg swelling, flank pain, diarrhea, blood in stool, flu-like sx.    Procedure Date: N/A    Hospital Course (updated, brief assessment by system or problem, significant events): In the ED, found be hypertensive to 220-230/80-90 (calculated ) upon presentation (noon). Was given hydralazine 50 mg PO x 1 with no improvement: /80 at 4 pm. Was started on nicardipine drip at 4 pm with SBP down to 152/68 at 5:30 pm. Nicardipine drip was paused, followed by BP up to 205/110 at 6:30 pm. Nicardipine drip was resume afterwards.     Also found to have Cr 3.3 (previously 2.4 in 10/2016, 0.8-1.3 in 2015), BUN 32. Bradycardic to 57-62.  (previously 75-200s), troponin negative. EKG showed sinus bradycardia with 1st degree AV block and RBBB. CXR notable for chronic/stable tortuous, atherosclerotic thoracic aorta. CT head non contrast negative for acute intracranial processes.     MICU was consulted for hypertensive emergency on nicardipine drip. Continued on nicardipine drip overnight with labile BP, varying between 120s-130s/60s-70s when drip was at 5-7.5 mg/hr and 170s-190s/70s-90s when drip was paused. Home hydralazine dose was increased from 50 mg QID to 100 mg TID. Resumed on home nebivolol 10 mg daily. Started on amlodipine 5 mg daily on hospital day 1. FeUrea 62%, urine protein/Cr 12, consistent with hypertensive nephropathy. Renal ultrasound notable for decreased perfusion to kidneys bilaterally. Resumed on torsemide 20 mg daily on hospital " day 1.    Patient no longer required Nicardipine drip, and systolic fell naturally in the 140-160s. On PO anti-hypertensive medications only, and doing well.     Tasks (specific, using if-then statements):   1)HTN - Patient off of Nicardipine drip, systolics in 140-160. Continue home meds for HTN.   2)F/u 2D Echo.      Contingency Plan (special circumstances anticipated and plan): Discharge to NH. Patient is DNR.  Estimated Discharge Date: TBD    Discharge Disposition: Nursing Facility    Mentored By: Dr. Mateo Heath

## 2017-10-28 NOTE — NURSING TRANSFER
Nursing Transfer Note      10/28/2017     Transfer From: room 6077    Transfer via bed    Transfer with  to O2, cardiac monitoring    Transported by nurse    Medicines sent: yes    Chart send with patient: Yes    Notified: nurse    Patient reassessed at: 10/28/17@1810     Upon arrival to floor: cardiac monitor applied, patient oriented to room, call bell in reach and bed in lowest position

## 2017-10-28 NOTE — H&P
"[facility]  Critical Care Medicine  History & Physical    Patient Name: Elly Lal  MRN: [id number]  Admission Date: 10/27/2017  Hospital Length of Stay: 0 days  Code Status: DNR  Attending Physician: Alessio Oconnor, *   Primary Care Provider: Roel Muñoz MD   Principal Problem: <principal problem not specified>    Subjective:     HPI:  89 F with HTN and CKD4 is transferred from nursing home on 10/27/17 for worsening HTN. History obtained from patient and family members.    Per family, her baseline BP is "normal" (120s-130s). For the past 2 weeks, patient has been having SBP in the 170s (early in the morning and throughout the day). A few days ago, SBP went up to the 190s, and her hydralazine 50 mg was increased from TID to QID 3 days ago. This morning, her SBP was 220. She resides in a nursing home and is compliant with her outpatient medication regimen, which includes hydralazine 50 mg QID, nebivolol 10 mg daily, and torsemide 20 daily, all most recently taken this morning prior to presentation. Reports eating salty foods. Per family, has had decreased PO intake for the past 2 weeks because she does not like the food at the nursing home. She has been seeing her outside doctor for HTN and kidney disease (unclear specific diagnosis) for the past few months. Reports urinary frequency. Denies decreased UOP, blood in urine, pain with urination.    She reports headache associated with her HTN for the past 2 week, described as a band wrapped around her head from the lower occipital area to forehead bilaterally. HA occurs 1-2 times daily, worst in the mornings and worse with any kind of movement/sitting upright, improved with lying down/still. No hx of chronic headaches or migraines. Associated sensation of room spinning. Denies n/v, light/sound sensitivity, vision changes, focal weakness, abnormal sensation, neck pain/stiffness, lightheadedness, syncope/LOC, fall. Per family, had " "delirium "was talking nonsense" a few days ago when she had an episode of UTI, which has since resolved. Has dementia at baseline (alzheimer's vs LBD, per neuro). Family denies acute changes in mental status.    ROS notable for abdominal distension, LLQ abdominal pain upon touch, and R sided torso pain upon touch. Has chronic back pain. Denies leg swelling, flank pain, diarrhea, blood in stool, flu-like sx.    Hospital/ICU Course:  In the ED, found be hypertensive to 220-230/80-90 (calculated ) upon presentation (noon). Was given hydralazine 50 mg PO x 1 with no improvement: /80 at 4 pm. Was started on nicardipine drip at 4 pm with SBP down to 152/68 at 5:30 pm. Nicardipine drip was paused, followed by BP up to 205/110 at 6:30 pm. Nicardipine drip was resume afterwards.    Also found to have Cr 3.3 (previously 2.4 in 10/2016, 0.8-1.3 in 2015), BUN 32. Bradycardic to 57-62.  (previously 75-200s), troponin negative. EKG showed sinus bradycardia with 1st degree AV block and RBBB. CXR notable for chronic/stable tortuous, atherosclerotic thoracic aorta.    MICU was consulted for hypertensive emergency on nicardipine drip.     Past Medical History:   Diagnosis Date    Abdominal fistula     Acute renal failure syndrome     Anemia     Anemia     Arthritis     Bowel obstruction 2011    Bowel obstruction     Dementia     Depression     Diverticulitis, colon     Dysphagia     GERD (gastroesophageal reflux disease)     Hernia, abdominal     Hyperlipidemia     Hypertension     Hypertension primary    Hypokalemia     Insomnia     Seasonal allergies        Past Surgical History:   Procedure Laterality Date    COLON SURGERY      alberto's, with hysterectomy    COLOSTOMY         Review of patient's allergies indicates:  No Known Allergies    Family History     None        Social History Main Topics    Smoking status: Never Smoker    Smokeless tobacco: Never Used    Alcohol use No    Drug " use: No    Sexual activity: Not Currently      Review of Systems   Constitutional: Positive for appetite change. Negative for chills, diaphoresis and fever.   HENT: Negative for facial swelling, sinus pain and sore throat.    Eyes: Negative for photophobia and visual disturbance.   Respiratory: Negative for shortness of breath.    Cardiovascular: Negative for chest pain and leg swelling.   Gastrointestinal: Positive for abdominal distention and abdominal pain. Negative for blood in stool, constipation, diarrhea, nausea and vomiting.   Genitourinary: Positive for frequency. Negative for decreased urine volume, difficulty urinating, dysuria, flank pain, hematuria and urgency.   Musculoskeletal: Positive for back pain. Negative for gait problem, neck pain and neck stiffness.   Skin: Negative for color change and rash.   Neurological: Positive for dizziness and headaches. Negative for tremors, syncope, weakness, light-headedness and numbness.   Psychiatric/Behavioral: Negative for behavioral problems and hallucinations.     Objective:     Vital Signs (Most Recent):  Temp: 98.6 °F (37 °C) (10/27/17 1406)  Pulse: 62 (10/27/17 2230)  Resp: 16 (10/27/17 1751)  BP: (!) 180/72 (10/27/17 2230)  SpO2: 96 % (10/27/17 2231) Vital Signs (24h Range):  Temp:  [98.6 °F (37 °C)-98.7 °F (37.1 °C)] 98.6 °F (37 °C)  Pulse:  [57-62] 62  Resp:  [16-18] 16  SpO2:  [92 %-98 %] 96 %  BP: (130-230)/() 180/72   Weight: 68 kg (150 lb)  Body mass index is 27.44 kg/m².    No intake or output data in the 24 hours ending 10/27/17 2239    Physical Exam   Constitutional: She appears well-developed and well-nourished. No distress.   HENT:   Head: Normocephalic and atraumatic.   Eyes: Conjunctivae, EOM and lids are normal.   Neck: Normal range of motion. Neck supple. Carotid bruit is not present.   Cardiovascular: Regular rhythm, S1 normal, S2 normal and intact distal pulses.  Bradycardia present.  Exam reveals no gallop and no friction rub.     No murmur heard.  Pulmonary/Chest: Effort normal and breath sounds normal. No respiratory distress. She has no rales.   Abdominal: Bowel sounds are normal. She exhibits distension. There is tenderness in the left lower quadrant. There is no rebound, no guarding and no CVA tenderness.   Colostomy bag in place, draining, clean, non erythematous   Musculoskeletal:   1+ pitting edema in bilateral shins   Neurological: She is alert.   Oriented to person, place, and event, but not to time   Skin: Skin is warm, dry and intact. No rash noted. She is not diaphoretic.   Psychiatric: Her speech is normal.        Lines/Drains/Airways     Drain                 Colostomy LUQ -- days         Closed/Suction Drain 02/19/15 1509 Abdomen  981 days         Closed/Suction Drain 02/19/15 1509 Abdomen Bulb  981 days         Colostomy 02/19/15 1211  days         Colostomy 03/08/15  days          Peripheral Intravenous Line                 Peripheral IV - Single Lumen 03/08/15 1310 Right Antecubital 964 days         Peripheral IV - Single Lumen 10/27/17 1310 Right Antecubital less than 1 day              Significant Labs:  CBC/Anemia Profile:    Recent Labs  Lab 10/27/17  1312   WBC 8.86   HGB 11.6*   HCT 37.4      MCV 89   RDW 14.6*      Chemistries:    Recent Labs  Lab 10/27/17  1312 10/27/17  1324     --    K 3.8  --      --    CO2 26  --    BUN 32*  --    CREATININE 3.3*  --    CALCIUM 8.4*  --    ALBUMIN 2.5*  --    PROT 6.7  --    BILITOT 0.3  --    ALKPHOS 69  --    ALT 8*  --    AST 14  --    MG  --  2.6   PHOS  --  3.9     Cardiac profile:   10/27/2017 13:24   Troponin I 0.018    (H)     Significant Imaging:   EKG 10/27/17:  Sinus bradycardia with 1st degree A-V block  Right bundle branch block  Abnormal ECG  When compared with ECG of 06-APR-2014 15:15,  T wave inversion no longer evident in Anterior leads    CXR 10/27/17:  Stable radiographic appearance of the chest compared to 12/2014 in  this elderly woman with atherosclerosis. Thoracic aorta is tortuous and atherosclerotic.  Cardiac silhouette is mildly enlarged but stable. No convincing evidence of pulmonary disease.    Assessment/Plan:     Neuro   Dementia without behavioral disturbance    -No acute changes in mental status from baseline.  -Continue home donepezil.        Headache    -Likely 2/2 HTN. Ddx includes tension Ha, given characteristic. Migraines less likely. No signs/sx of increased ICP. CT head on admission negative for acute processes.  -Tylenol PRN.        Psychiatric   Depression    -Currently controlled.  -Continue home citalopram.  -Continue home mirtazapine.        Cardiac/Vascular   Hypertensive emergency    -Meets criteria for hypertensive emergency, given SBP up to 230, DBP up to 110 with Cr 3.3, significantly elevated from 1 year ago. Likely 2/2 worsening kidney functions, given CKD 4, and high salt intake. No acute intracranial bleed on CT head on admission. No signs of HTN cardiomyopathy on EKG on admission.  -Continue nicardipine drip, titrate for:   Goal MAP in first hour: 107 (25% decrease from max MAP of 143).   Goal MAP in 24 hours: 90 (15% decrease from MAP after first hour).  -Increase home hydralazine from 50 mg QID to 100 mg TID Po.  -Resume home amlopidine 5 mg daily.  -Continue home nebivolol 10 mg daily.        Renal/   Urinary incontinence    -Continue home solifenacin.        CKD stage 4 secondary to hypertension    -eGFR < 20.  -Continue home ergocalciferol.  -Continue home calcitriol.  -Renally dose meds.  -Phosphorus labs daily. Supplement PRN.  -Magnesium labs daily. Supplement PRN for goal > 2.0.        IONA (acute kidney injury)    -IONA with Cr 3.3 on presentation, in the setting of CKD 4, likely 2/2 HTN emergency. Ddx includes pre-renal IONA, given decreased PO intake for the past 2 weeks. However, elevated BNP (484 on admission) consistent with volume overload. Low suspicion for ATN or AIN, given pt  not chronically on nephrotoxic meds. No signs/sx of pyelonephritis.  -Urine studies: urine creatinine, urea, and protein.  -Diurese with home torsemide 20 daily.  -Strict I/O.  -Consider repeating BNP after reaching euvolemia.  -Renally dose meds.  -Avoid nephrotoxins.  -Renal diet.  -CMP daily.  -Consider renal ultrasound to evaluate for renal artery stenosis.  -2D echo to evaluate for cardiac function.        Oncology   Anemia of chronic disease    -Likely 2/2 CKD. Hgb 11.6 on admission. Baseline hgb 9-10.  -Continue home ferrous gluconate.  -Continue home erythropoetin Q7days.  -Continue home folate.  -CBC daily.        GI   Colostomy in place    -No signs/sx of leak or infection.  -Daily colostomy bag changes per nursing.        Other   Seasonal allergies    -Continue home cetrizine.            Critical Care Daily Checklist:    A: Awake: RASS Goal/Actual Goal:    Actual:     B: Spontaneous Breathing Trial Performed?     C: SAT & SBT Coordinated?  Does not apply                D: Delirium: CAM-ICU     E: Early Mobility Performed? No   F: Feeding Goal:    Status:     Current Diet Order   Procedures    Diet renal      AS: Analgesia/Sedation None   T: Thromboembolic Prophylaxis Heparin 5000 units subQ Q8h   H: HOB > 300 No   U: Stress Ulcer Prophylaxis (if needed) None indicated   G: Glucose Control None   B: Bowel Function     I: Indwelling Catheter (Lines & Lafleur) Necessity None   D: De-escalation of Antimicrobials/Pharmacotherapies Does not apply    Plan for the day/ETD Admit to MICU for HTN emergency    Code Status:  Family/Goals of Care: DNR         Critical secondary to Patient has a condition that poses threat to life and bodily function: Hypertension, Acute Renal Failure.     Critical care was time spent personally by me on the following activities: development of treatment plan with patient or surrogate and bedside caregivers, discussions with consultants, evaluation of patient's response to treatment,  examination of patient, ordering and performing treatments and interventions, ordering and review of laboratory studies, ordering and review of radiographic studies, pulse oximetry, re-evaluation of patient's condition. This critical care time did not overlap with that of any other provider or involve time for any procedures.     Melissa Rosenberg MD  Critical Care Medicine  [facility]

## 2017-10-28 NOTE — PLAN OF CARE
Problem: Patient Care Overview  Goal: Plan of Care Review  Outcome: Ongoing (interventions implemented as appropriate)  All VSS. Cardene gtt currently off. Patient on 2 L nasal cannula with no distress noted. Will continue to monitor closely. Daughter at bedside. Helps with communicating with patient who is Polish-speaking only

## 2017-10-28 NOTE — NURSING
Cardene weaned off at 10am per MD order, BP medications changed. SBP <160, during echocardiogram pt became bradycardic, HR as low as 45, MD notified, Atropine at bedside. Will continue to monitor.

## 2017-10-28 NOTE — SUBJECTIVE & OBJECTIVE
Past Medical History:   Diagnosis Date    Abdominal fistula     Acute renal failure syndrome     Anemia     Anemia     Arthritis     Bowel obstruction 2011    Bowel obstruction     Dementia     Depression     Diverticulitis, colon     Dysphagia     GERD (gastroesophageal reflux disease)     Hernia, abdominal     Hyperlipidemia     Hypertension     Hypertension primary    Hypokalemia     Insomnia     Seasonal allergies        Past Surgical History:   Procedure Laterality Date    COLON SURGERY      alberto's, with hysterectomy    COLOSTOMY         Review of patient's allergies indicates:  No Known Allergies    Family History     None        Social History Main Topics    Smoking status: Never Smoker    Smokeless tobacco: Never Used    Alcohol use No    Drug use: No    Sexual activity: Not Currently      Review of Systems   Constitutional: Positive for appetite change. Negative for chills, diaphoresis and fever.   HENT: Negative for facial swelling, sinus pain and sore throat.    Eyes: Negative for photophobia and visual disturbance.   Respiratory: Negative for shortness of breath.    Cardiovascular: Negative for chest pain and leg swelling.   Gastrointestinal: Positive for abdominal distention and abdominal pain. Negative for blood in stool, constipation, diarrhea, nausea and vomiting.   Genitourinary: Positive for frequency. Negative for decreased urine volume, difficulty urinating, dysuria, flank pain, hematuria and urgency.   Musculoskeletal: Positive for back pain. Negative for gait problem, neck pain and neck stiffness.   Skin: Negative for color change and rash.   Neurological: Positive for dizziness and headaches. Negative for tremors, syncope, weakness, light-headedness and numbness.   Psychiatric/Behavioral: Negative for behavioral problems and hallucinations.     Objective:     Vital Signs (Most Recent):  Temp: 98.6 °F (37 °C) (10/27/17 1406)  Pulse: 62 (10/27/17 2230)  Resp: 16  (10/27/17 1751)  BP: (!) 180/72 (10/27/17 2230)  SpO2: 96 % (10/27/17 2231) Vital Signs (24h Range):  Temp:  [98.6 °F (37 °C)-98.7 °F (37.1 °C)] 98.6 °F (37 °C)  Pulse:  [57-62] 62  Resp:  [16-18] 16  SpO2:  [92 %-98 %] 96 %  BP: (130-230)/() 180/72   Weight: 68 kg (150 lb)  Body mass index is 27.44 kg/m².    No intake or output data in the 24 hours ending 10/27/17 2239    Physical Exam   Constitutional: She appears well-developed and well-nourished. No distress.   HENT:   Head: Normocephalic and atraumatic.   Eyes: Conjunctivae, EOM and lids are normal.   Neck: Normal range of motion. Neck supple. Carotid bruit is not present.   Cardiovascular: Regular rhythm, S1 normal, S2 normal and intact distal pulses.  Bradycardia present.  Exam reveals no gallop and no friction rub.    No murmur heard.  Pulmonary/Chest: Effort normal and breath sounds normal. No respiratory distress. She has no rales.   Abdominal: Bowel sounds are normal. She exhibits distension. There is tenderness in the left lower quadrant. There is no rebound, no guarding and no CVA tenderness.   Colostomy bag in place, draining, clean, non erythematous   Musculoskeletal:   1+ pitting edema in bilateral shins   Neurological: She is alert.   Oriented to person, place, and event, but not to time   Skin: Skin is warm, dry and intact. No rash noted. She is not diaphoretic.   Psychiatric: Her speech is normal.        Lines/Drains/Airways     Drain                 Colostomy LUQ -- days         Closed/Suction Drain 02/19/15 1509 Abdomen  981 days         Closed/Suction Drain 02/19/15 1509 Abdomen Bulb  981 days         Colostomy 02/19/15 1211  days         Colostomy 03/08/15  days          Peripheral Intravenous Line                 Peripheral IV - Single Lumen 03/08/15 1310 Right Antecubital 964 days         Peripheral IV - Single Lumen 10/27/17 1310 Right Antecubital less than 1 day              Significant Labs:  CBC/Anemia  Profile:    Recent Labs  Lab 10/27/17  1312   WBC 8.86   HGB 11.6*   HCT 37.4      MCV 89   RDW 14.6*      Chemistries:    Recent Labs  Lab 10/27/17  1312 10/27/17  1324     --    K 3.8  --      --    CO2 26  --    BUN 32*  --    CREATININE 3.3*  --    CALCIUM 8.4*  --    ALBUMIN 2.5*  --    PROT 6.7  --    BILITOT 0.3  --    ALKPHOS 69  --    ALT 8*  --    AST 14  --    MG  --  2.6   PHOS  --  3.9     Cardiac profile:   10/27/2017 13:24   Troponin I 0.018    (H)     Significant Imaging:   EKG 10/27/17:  Sinus bradycardia with 1st degree A-V block  Right bundle branch block  Abnormal ECG  When compared with ECG of 06-APR-2014 15:15,  T wave inversion no longer evident in Anterior leads    CXR 10/27/17:  Stable radiographic appearance of the chest compared to 12/2014 in this elderly woman with atherosclerosis. Thoracic aorta is tortuous and atherosclerotic.  Cardiac silhouette is mildly enlarged but stable. No convincing evidence of pulmonary disease.

## 2017-10-28 NOTE — HOSPITAL COURSE
In the ED, found be hypertensive to 220-230/80-90 (calculated ) upon presentation (noon). Was given hydralazine 50 mg PO x 1 with no improvement: /80 at 4 pm. Was started on nicardipine drip at 4 pm with SBP down to 152/68 at 5:30 pm. Nicardipine drip was paused, followed by BP up to 205/110 at 6:30 pm. Nicardipine drip was resume afterwards.    Also found to have Cr 3.3 (previously 2.4 in 10/2016, 0.8-1.3 in 2015), BUN 32. Bradycardic to 57-62.  (previously 75-200s), troponin negative. EKG showed sinus bradycardia with 1st degree AV block and RBBB. CXR notable for chronic/stable tortuous, atherosclerotic thoracic aorta. CT head non contrast negative for acute intracranial processes. Desatted to 90% prior to admission, was started on 2 L NC.    MICU was consulted for hypertensive emergency on nicardipine drip. Continued on nicardipine drip overnight with labile BP, varying between 120s-130s/60s-70s when drip was at 5-7.5 mg/hr and 170s-190s/70s-90s when drip was paused. MAP reached 80s-low 100s within 5 hours of admission. Home hydralazine dose was increased from 50 mg QID to 100 mg TID. Resumed on home nebivolol 10 mg daily. Started on amlodipine 5 mg daily on hospital day 1. FeUrea 62%, urine protein/Cr 12, consistent with hypertensive nephropathy. Renal ultrasound notable for decreased perfusion to kidneys bilaterally consistent with chronic medical renal disease.    Hospital day 1: Headache and dizziness resolved and Cr improved to 3.0 with control of HTN. Weaned off nicardipine drip. Hydralazine switched back to 50 mg QID, amlodipine increased to 10 mg daily, switched from nebivolol to carvedilol 6.25 mg BID, and resumed home torsemide 20 mg daily. Stepped down to floor.

## 2017-10-28 NOTE — ASSESSMENT & PLAN NOTE
-Meets criteria for hypertensive emergency, given SBP up to 230, DBP up to 110 with Cr 3.3, significantly elevated from 1 year ago. Likely 2/2 worsening kidney functions, given CKD 4, and high salt intake. No acute intracranial bleed on CT head on admission. No signs of HTN cardiomyopathy on EKG on admission.  -Continue nicardipine drip, titrate for:   Goal MAP in first hour: 107 (25% decrease from max MAP of 143).   Goal MAP in 24 hours: 90 (15% decrease from MAP after first hour).  -Increase home hydralazine from 50 mg QID to 100 mg TID Po.  -Resume home amlopidine 5 mg daily.  -Continue home nebivolol 10 mg daily.

## 2017-10-28 NOTE — ASSESSMENT & PLAN NOTE
-IONA with Cr 3.3 on presentation, in the setting of CKD 4, likely 2/2 HTN emergency. Ddx includes pre-renal IONA, given decreased PO intake for the past 2 weeks. However, elevated BNP (484 on admission) consistent with volume overload. Low suspicion for ATN or AIN, given pt not chronically on nephrotoxic meds. No signs/sx of pyelonephritis.  -Urine studies: urine creatinine, urea, and protein.  -Diurese with home torsemide 20 daily.  -Strict I/O.  -Consider repeating BNP after reaching euvolemia.  -Renally dose meds.  -Avoid nephrotoxins.  -Renal diet.  -CMP daily.  -Consider renal ultrasound to evaluate for renal artery stenosis.  -2D echo to evaluate for cardiac function.

## 2017-10-28 NOTE — NURSING TRANSFER
Nursing Transfer Note      10/28/2017     Transfer To: [id number]    Transfer via bed    Transfer with 2L NC to O2, cardiac monitoring    Transported by 1 RN and PCT    Medicines sent: YES    Chart send with patient: Yes    Notified: daughter    Patient reassessed at: [phone]    Upon arrival to floor: cardiac monitor applied, patient oriented to room, call bell in reach and bed in lowest position. Nurse in room to assess patient, questions and concerns addressed.

## 2017-10-28 NOTE — PLAN OF CARE
Problem: Patient Care Overview  Goal: Plan of Care Review  Outcome: Ongoing (interventions implemented as appropriate)  VSS. Pt AAOx4, cardene drip weaned off at 10am. BP medications changed to coreg from nebivolol. SBP maintained <160 throughout shift. Echo done today, HR dropped to 45 during echo, MD notified, atropine at bedside. Pt on 2L NC sats>95%. Transfer orders in place, will continue to monitor.

## 2017-10-28 NOTE — ASSESSMENT & PLAN NOTE
-Likely 2/2 CKD. Hgb 11.6 on admission. Baseline hgb 9-10.  -Continue home ferrous gluconate.  -Continue home erythropoetin Q7days.  -Continue home folate.  -CBC daily.

## 2017-10-28 NOTE — PROGRESS NOTES
Pt is NH resident. Dementia at baseline and code DNR status. Not a candidate for DMHF program.    Removed from HF list.

## 2017-10-28 NOTE — ASSESSMENT & PLAN NOTE
-Likely 2/2 HTN. Ddx includes tension Ha, given characteristic. Migraines less likely. No signs/sx of increased ICP. CT head on admission negative for acute processes.  -Tylenol PRN.

## 2017-10-29 LAB
ANION GAP SERPL CALC-SCNC: 8 MMOL/L
BACTERIA #/AREA URNS AUTO: ABNORMAL /HPF
BASOPHILS # BLD AUTO: 0.02 K/UL
BASOPHILS NFR BLD: 0.3 %
BILIRUB UR QL STRIP: NEGATIVE
BUN SERPL-MCNC: 31 MG/DL
CALCIUM SERPL-MCNC: 8.1 MG/DL
CHLORIDE SERPL-SCNC: 105 MMOL/L
CLARITY UR REFRACT.AUTO: ABNORMAL
CO2 SERPL-SCNC: 25 MMOL/L
COLOR UR AUTO: YELLOW
CREAT SERPL-MCNC: 3.3 MG/DL
DIFFERENTIAL METHOD: ABNORMAL
EOSINOPHIL # BLD AUTO: 0.3 K/UL
EOSINOPHIL NFR BLD: 3.4 %
ERYTHROCYTE [DISTWIDTH] IN BLOOD BY AUTOMATED COUNT: 14.3 %
EST. GFR  (AFRICAN AMERICAN): 13.6 ML/MIN/1.73 M^2
EST. GFR  (NON AFRICAN AMERICAN): 11.8 ML/MIN/1.73 M^2
GLUCOSE SERPL-MCNC: 96 MG/DL
GLUCOSE UR QL STRIP: ABNORMAL
HCT VFR BLD AUTO: 36.4 %
HGB BLD-MCNC: 11.3 G/DL
HGB UR QL STRIP: NEGATIVE
HYALINE CASTS UR QL AUTO: 0 /LPF
IMM GRANULOCYTES # BLD AUTO: 0.04 K/UL
IMM GRANULOCYTES NFR BLD AUTO: 0.5 %
KETONES UR QL STRIP: NEGATIVE
LEUKOCYTE ESTERASE UR QL STRIP: NEGATIVE
LYMPHOCYTES # BLD AUTO: 1 K/UL
LYMPHOCYTES NFR BLD: 12.9 %
MAGNESIUM SERPL-MCNC: 2.1 MG/DL
MCH RBC QN AUTO: 27.5 PG
MCHC RBC AUTO-ENTMCNC: 31 G/DL
MCV RBC AUTO: 89 FL
MICROSCOPIC COMMENT: ABNORMAL
MONOCYTES # BLD AUTO: 0.7 K/UL
MONOCYTES NFR BLD: 9.3 %
NEUTROPHILS # BLD AUTO: 5.5 K/UL
NEUTROPHILS NFR BLD: 73.6 %
NITRITE UR QL STRIP: NEGATIVE
NRBC BLD-RTO: 0 /100 WBC
PH UR STRIP: 5 [PH] (ref 5–8)
PHOSPHATE SERPL-MCNC: 4.7 MG/DL
PLATELET # BLD AUTO: 326 K/UL
PMV BLD AUTO: 10.4 FL
POTASSIUM SERPL-SCNC: 4.1 MMOL/L
PROT UR QL STRIP: ABNORMAL
RBC # BLD AUTO: 4.11 M/UL
RBC #/AREA URNS AUTO: 1 /HPF (ref 0–4)
SODIUM SERPL-SCNC: 138 MMOL/L
SP GR UR STRIP: 1.01 (ref 1–1.03)
SQUAMOUS #/AREA URNS AUTO: 3 /HPF
URN SPEC COLLECT METH UR: ABNORMAL
UROBILINOGEN UR STRIP-ACNC: NEGATIVE EU/DL
WBC # BLD AUTO: 7.44 K/UL
WBC #/AREA URNS AUTO: 15 /HPF (ref 0–5)

## 2017-10-29 PROCEDURE — 85025 COMPLETE CBC W/AUTO DIFF WBC: CPT

## 2017-10-29 PROCEDURE — 80048 BASIC METABOLIC PNL TOTAL CA: CPT

## 2017-10-29 PROCEDURE — 25000003 PHARM REV CODE 250: Performed by: STUDENT IN AN ORGANIZED HEALTH CARE EDUCATION/TRAINING PROGRAM

## 2017-10-29 PROCEDURE — 36415 COLL VENOUS BLD VENIPUNCTURE: CPT

## 2017-10-29 PROCEDURE — A4216 STERILE WATER/SALINE, 10 ML: HCPCS | Performed by: INTERNAL MEDICINE

## 2017-10-29 PROCEDURE — 25000003 PHARM REV CODE 250: Performed by: INTERNAL MEDICINE

## 2017-10-29 PROCEDURE — 63600175 PHARM REV CODE 636 W HCPCS: Performed by: INTERNAL MEDICINE

## 2017-10-29 PROCEDURE — 99232 SBSQ HOSP IP/OBS MODERATE 35: CPT | Mod: GC,,, | Performed by: HOSPITALIST

## 2017-10-29 PROCEDURE — 84100 ASSAY OF PHOSPHORUS: CPT

## 2017-10-29 PROCEDURE — 83735 ASSAY OF MAGNESIUM: CPT

## 2017-10-29 PROCEDURE — 20600001 HC STEP DOWN PRIVATE ROOM

## 2017-10-29 PROCEDURE — 97161 PT EVAL LOW COMPLEX 20 MIN: CPT

## 2017-10-29 PROCEDURE — 81001 URINALYSIS AUTO W/SCOPE: CPT

## 2017-10-29 RX ORDER — CLONIDINE 0.2 MG/24H
1 PATCH, EXTENDED RELEASE TRANSDERMAL
COMMUNITY

## 2017-10-29 RX ORDER — SOLIFENACIN SUCCINATE 5 MG/1
5 TABLET, FILM COATED ORAL DAILY
Status: DISCONTINUED | OUTPATIENT
Start: 2017-10-30 | End: 2017-10-31 | Stop reason: HOSPADM

## 2017-10-29 RX ORDER — SENNOSIDES 8.6 MG/1
8.6 TABLET ORAL DAILY PRN
Status: DISCONTINUED | OUTPATIENT
Start: 2017-10-29 | End: 2017-10-29

## 2017-10-29 RX ORDER — CITALOPRAM 20 MG/1
20 TABLET, FILM COATED ORAL DAILY
Status: DISCONTINUED | OUTPATIENT
Start: 2017-10-30 | End: 2017-10-31 | Stop reason: HOSPADM

## 2017-10-29 RX ORDER — HYDRALAZINE HYDROCHLORIDE 50 MG/1
100 TABLET, FILM COATED ORAL EVERY 8 HOURS
Status: DISCONTINUED | OUTPATIENT
Start: 2017-10-29 | End: 2017-10-30

## 2017-10-29 RX ORDER — ISOSORBIDE MONONITRATE 30 MG/1
30 TABLET, EXTENDED RELEASE ORAL DAILY
Status: DISCONTINUED | OUTPATIENT
Start: 2017-10-29 | End: 2017-10-31

## 2017-10-29 RX ORDER — TALC
1 POWDER (GRAM) TOPICAL NIGHTLY PRN
COMMUNITY

## 2017-10-29 RX ORDER — DICYCLOMINE HYDROCHLORIDE 10 MG/1
10 CAPSULE ORAL DAILY
Status: DISCONTINUED | OUTPATIENT
Start: 2017-10-30 | End: 2017-10-31 | Stop reason: HOSPADM

## 2017-10-29 RX ORDER — FERROUS SULFATE 325(65) MG
325 TABLET ORAL 3 TIMES DAILY
COMMUNITY

## 2017-10-29 RX ORDER — AMOXICILLIN 250 MG
1 CAPSULE ORAL DAILY
Status: DISCONTINUED | OUTPATIENT
Start: 2017-10-29 | End: 2017-10-31 | Stop reason: HOSPADM

## 2017-10-29 RX ORDER — DEXTROMETHORPHAN HYDROBROMIDE, GUAIFENESIN 5; 100 MG/5ML; MG/5ML
650 LIQUID ORAL EVERY 6 HOURS PRN
COMMUNITY

## 2017-10-29 RX ADMIN — HEPARIN SODIUM 5000 UNITS: 5000 INJECTION, SOLUTION INTRAVENOUS; SUBCUTANEOUS at 02:10

## 2017-10-29 RX ADMIN — HEPARIN SODIUM 5000 UNITS: 5000 INJECTION, SOLUTION INTRAVENOUS; SUBCUTANEOUS at 09:10

## 2017-10-29 RX ADMIN — AMLODIPINE BESYLATE 10 MG: 10 TABLET ORAL at 09:10

## 2017-10-29 RX ADMIN — DICYCLOMINE HYDROCHLORIDE 10 MG: 10 CAPSULE ORAL at 05:10

## 2017-10-29 RX ADMIN — FERROUS GLUCONATE TAB 324 MG (37.5 MG ELEMENTAL IRON) 324 MG: 324 (37.5 FE) TAB at 05:10

## 2017-10-29 RX ADMIN — CITALOPRAM HYDROBROMIDE 10 MG: 10 TABLET ORAL at 09:10

## 2017-10-29 RX ADMIN — Medication 3 ML: at 05:10

## 2017-10-29 RX ADMIN — MIRTAZAPINE 15 MG: 15 TABLET, FILM COATED ORAL at 09:10

## 2017-10-29 RX ADMIN — Medication 3 ML: at 10:10

## 2017-10-29 RX ADMIN — SOLIFENACIN SUCCINATE 10 MG: 10 TABLET, FILM COATED ORAL at 12:10

## 2017-10-29 RX ADMIN — HYDRALAZINE HYDROCHLORIDE 100 MG: 50 TABLET ORAL at 09:10

## 2017-10-29 RX ADMIN — HEPARIN SODIUM 5000 UNITS: 5000 INJECTION, SOLUTION INTRAVENOUS; SUBCUTANEOUS at 05:10

## 2017-10-29 RX ADMIN — CARVEDILOL 6.25 MG: 6.25 TABLET, FILM COATED ORAL at 09:10

## 2017-10-29 RX ADMIN — DONEPEZIL HYDROCHLORIDE 10 MG: 10 TABLET, FILM COATED ORAL at 09:10

## 2017-10-29 RX ADMIN — FERROUS GLUCONATE TAB 324 MG (37.5 MG ELEMENTAL IRON) 324 MG: 324 (37.5 FE) TAB at 12:10

## 2017-10-29 RX ADMIN — STANDARDIZED SENNA CONCENTRATE AND DOCUSATE SODIUM 1 TABLET: 8.6; 5 TABLET, FILM COATED ORAL at 09:10

## 2017-10-29 RX ADMIN — HYDRALAZINE HYDROCHLORIDE 50 MG: 50 TABLET ORAL at 05:10

## 2017-10-29 RX ADMIN — FERROUS GLUCONATE TAB 324 MG (37.5 MG ELEMENTAL IRON) 324 MG: 324 (37.5 FE) TAB at 09:10

## 2017-10-29 RX ADMIN — Medication 500 MG: at 09:10

## 2017-10-29 RX ADMIN — CALCITRIOL 0.5 MCG: 0.25 CAPSULE, LIQUID FILLED ORAL at 09:10

## 2017-10-29 RX ADMIN — DICYCLOMINE HYDROCHLORIDE 10 MG: 10 CAPSULE ORAL at 12:10

## 2017-10-29 RX ADMIN — FOLIC ACID 1 MG: 1 TABLET ORAL at 09:10

## 2017-10-29 RX ADMIN — Medication 3 ML: at 02:10

## 2017-10-29 RX ADMIN — HYDRALAZINE HYDROCHLORIDE 100 MG: 50 TABLET ORAL at 02:10

## 2017-10-29 RX ADMIN — ISOSORBIDE MONONITRATE 30 MG: 30 TABLET, EXTENDED RELEASE ORAL at 07:10

## 2017-10-29 NOTE — PLAN OF CARE
Problem: Patient Care Overview  Goal: Plan of Care Review  Plan of care reviewed. Pt Niuean speaking only. Daughter at bedside. BP meds adjusted during shift. Plan to d/c back to NH once BP stable.

## 2017-10-29 NOTE — PROGRESS NOTES
"[facility] Hospital Medicine  Progress Note    Patient Name: Elly Lal  MRN: [id number]  Patient Class: IP- Inpatient   Admission Date: 10/27/2017  Length of Stay: 2 days  Attending Physician: Ron Romero, *  Primary Care Provider: Roel Muñoz MD    [facility] Medicine Team: [facility] MED 1 Sb Carmona MD    Subjective:     Principal Problem:Hypertensive emergency    HPI:  89 F with HTN and CKD4 is transferred from nursing home on 10/27/17 for worsening HTN. History obtained from patient and family members.     Per family, her baseline BP is "normal" (120s-130s). For the past 2 weeks, patient has been having SBP in the 170s (early in the morning and throughout the day). A few days ago, SBP went up to the 190s, and her hydralazine 50 mg was increased from TID to QID 3 days ago. This morning, her SBP was 220. She resides in a nursing home and is compliant with her outpatient medication regimen, which includes hydralazine 50 mg QID, nebivolol 10 mg daily, and torsemide 20 daily, all most recently taken this morning prior to presentation. Reports eating salty foods. Per family, has had decreased PO intake for the past 2 weeks because she does not like the food at the nursing home. She has been seeing her outside doctor for HTN and kidney disease (unclear specific diagnosis) for the past few months. Reports urinary frequency. Denies decreased UOP, blood in urine, pain with urination.     She reports headache associated with her HTN for the past 2 week, described as a band wrapped around her head from the lower occipital area to forehead bilaterally. HA occurs 1-2 times daily, worst in the mornings and worse with any kind of movement/sitting upright, improved with lying down/still. No hx of chronic headaches or migraines. Associated sensation of room spinning. Denies n/v, light/sound sensitivity, vision changes, focal weakness, abnormal sensation, neck pain/stiffness, " "lightheadedness, syncope/LOC, fall. Per family, had delirium "was talking nonsense" a few days ago when she had an episode of UTI, which has since resolved. Has dementia at baseline (alzheimer's vs LBD, per neuro). Family denies acute changes in mental status.     ROS notable for abdominal distension, LLQ abdominal pain upon touch, and R sided torso pain upon touch. Has chronic back pain. Denies leg swelling, flank pain, diarrhea, blood in stool, flu-like sx.     Hospital/ICU Course:  In the ED, found be hypertensive to 220-230/80-90 (calculated ) upon presentation (noon). Was given hydralazine 50 mg PO x 1 with no improvement: /80 at 4 pm. Was started on nicardipine drip at 4 pm with SBP down to 152/68 at 5:30 pm. Nicardipine drip was paused, followed by BP up to 205/110 at 6:30 pm. Nicardipine drip was resume afterwards.     Also found to have Cr 3.3 (previously 2.4 in 10/2016, 0.8-1.3 in 2015), BUN 32. Bradycardic to 57-62.  (previously 75-200s), troponin negative. EKG showed sinus bradycardia with 1st degree AV block and RBBB. CXR notable for chronic/stable tortuous, atherosclerotic thoracic aorta.     MICU was consulted for hypertensive emergency on nicardipine drip.    Hospital Course:  10/29/2017- Stepped down to hospital medicine. Still hypertensive overnight up to the 180's systolic, adjusting dose of hydralazine to 100MG TID, will continue to adjust medications as needed. Will attempt to access outside records to establish baseline creatinine, per family patient has reduced renal function. Urinalysis ordered will follow up results.       Interval History: No acute events overnight, patient's headaches have resolved. Still with hypertension, will change hydralazine to 100MG TID and follow BPs. Attempting to contact PCP to establish baseline creatinine.     Review of Systems   Constitutional: Negative for chills and fever.   Respiratory: Negative for chest tightness and shortness of " breath.    Cardiovascular: Negative for chest pain and palpitations.   Gastrointestinal: Negative for abdominal distention, abdominal pain, constipation, diarrhea, nausea and vomiting.   Genitourinary: Negative for dysuria.   Neurological: Negative for headaches.     Objective:     Vital Signs (Most Recent):  Temp: 97.9 °F (36.6 °C) (10/29/17 0800)  Pulse: (!) 55 (10/29/17 1300)  Resp: 20 (10/29/17 1300)  BP: (!) 174/73 (10/29/17 1300)  SpO2: (!) 94 % (10/29/17 0800) Vital Signs (24h Range):  Temp:  [97.6 °F (36.4 °C)-98.9 °F (37.2 °C)] 97.9 °F (36.6 °C)  Pulse:  [51-60] 55  Resp:  [17-20] 20  SpO2:  [94 %-99 %] 94 %  BP: (111-183)/(54-87) 174/73     Weight: 68.4 kg (150 lb 12.7 oz)  Body mass index is 27.58 kg/m².    Intake/Output Summary (Last 24 hours) at 10/29/17 1315  Last data filed at 10/29/17 0400   Gross per 24 hour   Intake              240 ml   Output              450 ml   Net             -210 ml      Physical Exam   Constitutional: She appears well-developed and well-nourished. No distress.   HENT:   Head: Normocephalic and atraumatic.   Eyes: Conjunctivae, EOM and lids are normal.   Neck: Normal range of motion. Neck supple. Carotid bruit is not present.   Cardiovascular: Normal rate, regular rhythm, S1 normal, S2 normal and intact distal pulses.  Exam reveals no gallop and no friction rub.    No murmur heard.  Pulmonary/Chest: Effort normal and breath sounds normal. No respiratory distress. She has no rales.   Abdominal: Bowel sounds are normal. She exhibits distension. There is no tenderness. There is no rebound, no guarding and no CVA tenderness.   Colostomy bag in place, draining, clean, non erythematous   Musculoskeletal:   1+ pitting edema in bilateral shins   Neurological: She is alert.   Oriented to person, place, and event, but not to time   Skin: Skin is warm, dry and intact. No rash noted. She is not diaphoretic.   Psychiatric: Her speech is normal.       Significant Labs:   CBC:   Recent  Labs  Lab 10/28/17  0300 10/29/17  0458   WBC 8.76 7.44   HGB 11.1* 11.3*   HCT 35.4* 36.4*    326     CMP:   Recent Labs  Lab 10/28/17  0300 10/29/17  0458    138   K 3.7 4.1    105   CO2 22* 25   GLU 86 96   BUN 30* 31*   CREATININE 3.0* 3.3*   CALCIUM 8.3* 8.1*   PROT 5.9*  --    ALBUMIN 2.2*  --    BILITOT 0.3  --    ALKPHOS 56  --    AST 14  --    ALT 6*  --    ANIONGAP 10 8   EGFRNONAA 13.3* 11.8*     Urine Studies: No results for input(s): COLORU, APPEARANCEUA, PHUR, SPECGRAV, PROTEINUA, GLUCUA, KETONESU, BILIRUBINUA, OCCULTUA, NITRITE, UROBILINOGEN, LEUKOCYTESUR, RBCUA, WBCUA, BACTERIA, SQUAMEPITHEL, HYALINECASTS in the last 48 hours.    Invalid input(s): SHANTE    Significant Imaging: I have reviewed all pertinent imaging results/findings within the past 24 hours.    Assessment/Plan:      * Hypertensive emergency    -Meets criteria for hypertensive emergency, given SBP up to 230, DBP up to 110 with Cr 3.3, significantly elevated from 1 year ago. Likely 2/2 worsening kidney functions, given CKD 4, and high salt intake. No acute intracranial bleed on CT head on admission. No signs of HTN cardiomyopathy on EKG on admission, pending 2D echo with color flow doppler for further evaluation.  -Labile BP overnight on nicardipine drip, varying between 120s-130s/60s-70s when drip was at 5-7.5 mg/hr and 170s-190s/70s-90s when drip was paused. MAP 80s-low 100s since 11 pm yesterday.  -Weaned off nicardipine drip.  -Hydralazine 100 mg PO TID.  -Amlopidine dose to 10 mg PO daily.  -Carvedilol 6.25 mg PO BID.          Colostomy in place    -No signs/sx of leak or infection.  -Daily colostomy bag change per nursing          Urinary incontinence    -Continue home solifenacin.          Depression    -Currently controlled.  -Continue home citalopram.  -Continue home mirtazapine.          Anemia of chronic disease    -Hgb 11.6 on admission, baseline hgb 9-10. Likely 2/2 CKD, given normocytic anemia.     -Continue home ferrous gluconate.  -Continue home erythropoetin Q7days.  -Continue home folate.  -CBC daily          Headache    -Likely 2/2 HTN, given onset and duration coincide with recent worsening of uncontrolled HTN. Currently resolve after controlling BP with nicardipine drip. CT head on admission negative for acute intracranial processes.  -Continue tylenol PRN.  -Resolved now with controlled BP        CKD (chronic kidney disease) stage 5, GFR less than 15 ml/min    -Decline in kidney function with eGFR 37-58 in 2/2015 to 17 in 10/2016 to 11-13 this admission, consistent with CKD 5. Likely 2/2 HTN.  -Corrected calcium 9.6-9.7, phos WNL, no symptoms of renal osteodystrophy. Consider checking PTH and vitamin D for further evaluation.  -Continue home ergocalciferol.  -Continue home calcitriol.  -Renally dose meds.  -Magnesium labs daily  -Need to establish baseline creatinine           IONA (acute kidney injury)    -IONA with Cr 3.3 on presentation, likely 2/2 HTN emergency vs new baseline for worsening CKD 4. FeUrea 62%, urine protein/creatinine 12, suggesting intrinsic renal disease consistent with hypertensive nephropathy. Renal ultrasound negative for renal artery stenosis. Low suspicion for ATN due to nephrotoxic medications, given patient not chronically on NSAIDs. Pre-renal IONA less likely, given elevated BNP (484) on admission, suggesting volume overload. No signs/sx of pyelonephritis.  -Cr improved to 3.0 on hospital day 1 with control of HTN.  -holding torsemide 20 daily given increase in creatinine   -Strict I/O.  -Consider repeating BNP after reaching euvolemia.  -Renally dose meds.  -Avoid nephrotoxins.  -Renal diet.  -CMP daily.  -Need to establish baseline creatinine with PCP               VTE Risk Mitigation         Ordered     heparin (porcine) injection 5,000 Units  Every 8 hours     Route:  Subcutaneous        10/27/17 2134     Medium Risk of VTE  Once      10/27/17 2134              Sb BENAVIDES  MD Mathew  Department of Hospital Medicine   [facility]

## 2017-10-29 NOTE — ASSESSMENT & PLAN NOTE
-Hgb 11.6 on admission, baseline hgb 9-10. Likely 2/2 CKD, given normocytic anemia.    -Continue home ferrous gluconate.  -Continue home erythropoetin Q7days.  -Continue home folate.  -CBC daily

## 2017-10-29 NOTE — ASSESSMENT & PLAN NOTE
-Likely 2/2 HTN, given onset and duration coincide with recent worsening of uncontrolled HTN. Currently resolve after controlling BP with nicardipine drip. CT head on admission negative for acute intracranial processes.  -Continue tylenol PRN.  -Resolved now with controlled BP

## 2017-10-29 NOTE — PROGRESS NOTES
Urine specimen cup placed in RR. Pt and daughter both notified of need for specimen w/ next urination. Will continue to follow up.

## 2017-10-29 NOTE — ASSESSMENT & PLAN NOTE
-Hgb 11.6 on admission, baseline hgb 9-10. Likely 2/2 CKD, given normocytic anemia.    -Continue home ferrous gluconate.  -Continue home erythropoetin Q7days.  -Continue home folate.  -CBC daily.

## 2017-10-29 NOTE — PHARMACY MED REC
"Admission Medication Reconciliation - Pharmacy Consult Note    The home medication history was taken by Nisha Richards, Pharmacy Technician.  Based on information gathered and subsequent review by the clinical pharmacist, the items below may need attention.     You may go to "Admission" then "Reconcile Home Medications" tabs to review and/or act upon these items. Based on information gathered and subsequent review by the clinical pharmacist, the items below may need attention.    Potentially problematic discrepancies with current MAR  o Patient IS NOT taking the following which was ordered upon admit  o Fluticasone (FLONASE) 50mcg/actuation one spray into each nare daily, notifying hospital med team  o Patient is taking a drug DIFFERENTLY than how ordered upon admit  o Citalopram (CELEXA) 20mg daily, paging team to reconcile home/inpatient dose  o Dicyclomine (BENTYL) 10mg daily, paging team to reconcile home/inpatient dose  o Solifenecin (VESICARE) 5mg daily, paging team to reconcile home/inpatient dose    Potential issues to be addressed PRIOR TO DISCHARGE  o Patient lacks understanding of therapy   Nursing home patient    Patient's prior to admission medication regimen was as follows:  Medication Sig    acetaminophen (TYLENOL) 650 MG TbSR Take 650 mg by mouth every 6 (six) hours as needed (fever).    ascorbic acid (VITAMIN C) 500 MG tablet Take 500 mg by mouth once daily.    citalopram (CELEXA) 20 MG tablet Take 20 mg by mouth once daily.     cloNIDine 0.2 mg/24 hr td ptwk (CATAPRES) 0.2 mg/24 hr Place 1 patch onto the skin every Saturday.    dextran 70-hypromellose (TEARS) ophthalmic solution Place 1 drop into both eyes every 12 (twelve) hours as needed (dryness).    dicyclomine (BENTYL) 10 MG capsule Take 10 mg by mouth once daily.     donepezil (ARICEPT) 10 MG tablet Take 10 mg by mouth every evening.    epoetin melody 2,000 unit/mL Soln 1 mL, epoetin melody 3,000 unit/mL Soln 1 mL, epoetin melody 10,000 " unit/mL Soln 1 mL Inject 15,000 Units into the vein every Thursday.     ergocalciferol (VITAMIN D2) 50,000 unit Cap Take 50,000 Units by mouth every Thursday.     ferrous sulfate 325 mg (65 mg iron) Tab tablet Take 325 mg by mouth 3 (three) times daily.    folic acid (FOLVITE) 1 MG tablet Take 1 mg by mouth once daily.    GUAIFENESIN/DEXTROMETHORPHAN (ROBITUSSIN-DM ORAL) Take 10 mLs by mouth every 4 (four) hours as needed (cough).    hydrALAZINE (APRESOLINE) 50 MG tablet Take 50 mg by mouth every 6 (six) hours.     lactulose (CHRONULAC) 10 gram/15 mL solution Take 20 g by mouth 2 (two) times daily.     loratadine (CLARITIN) 10 mg tablet Take 10 mg by mouth once daily.    meclizine (ANTIVERT) 12.5 mg tablet Take 12.5 mg by mouth 3 (three) times daily as needed (motion sickness).     melatonin 3 mg Tab Take 1 tablet by mouth nightly as needed (insomnia).    mirtazapine (REMERON) 7.5 MG Tab Take 7.5 mg by mouth every evening.     nebivolol (BYSTOLIC) 10 MG tablet Take 10 mg by mouth 2 (two) times daily.     ondansetron (ZOFRAN) 4 MG tablet Take 4 mg by mouth every 4 (four) hours as needed for Nausea.     polyethylene glycol (GLYCOLAX) 17 gram PwPk Take 17 g by mouth 2 (two) times daily.     ranitidine (ZANTAC) 150 MG tablet Take 150 mg by mouth nightly.    rosuvastatin (CRESTOR) 10 MG tablet Take 10 mg by mouth every evening.    solifenacin (VESICARE) 5 MG tablet Take 5 mg by mouth once daily.     torsemide (DEMADEX) 20 MG Tab Take 20 mg by mouth once daily.    traMADol (ULTRAM) 50 mg tablet Take 50 mg by mouth every 4 (four) hours as needed for Pain.     triamcinolone acetonide 0.1% (KENALOG) 0.1 % cream Apply topically 2 (two) times daily. To upper back for itchiness until resolved     Please address this information as you see fit.  Feel free to contact us if you have any questions or require assistance.    Bar Crocker, PharmD  PGY-1 Pharmacy Resident  Spectralink: [phone]   Please add  appropriate    SmartPhrase below:

## 2017-10-29 NOTE — ASSESSMENT & PLAN NOTE
-Likely 2/2 HTN, given onset and duration coincide with recent worsening of uncontrolled HTN. Currently resolve after controlling BP with nicardipine drip. CT head on admission negative for acute intracranial processes.  -Continue tylenol PRN.

## 2017-10-29 NOTE — HPI
"89 F with HTN and CKD4 is transferred from nursing home on 10/27/17 for worsening HTN. History obtained from patient and family members.     Per family, her baseline BP is "normal" (120s-130s). For the past 2 weeks, patient has been having SBP in the 170s (early in the morning and throughout the day). A few days ago, SBP went up to the 190s, and her hydralazine 50 mg was increased from TID to QID 3 days ago. This morning, her SBP was 220. She resides in a nursing home and is compliant with her outpatient medication regimen, which includes hydralazine 50 mg QID, nebivolol 10 mg daily, and torsemide 20 daily, all most recently taken this morning prior to presentation. Reports eating salty foods. Per family, has had decreased PO intake for the past 2 weeks because she does not like the food at the nursing home. She has been seeing her outside doctor for HTN and kidney disease (unclear specific diagnosis) for the past few months. Reports urinary frequency. Denies decreased UOP, blood in urine, pain with urination.     She reports headache associated with her HTN for the past 2 week, described as a band wrapped around her head from the lower occipital area to forehead bilaterally. HA occurs 1-2 times daily, worst in the mornings and worse with any kind of movement/sitting upright, improved with lying down/still. No hx of chronic headaches or migraines. Associated sensation of room spinning. Denies n/v, light/sound sensitivity, vision changes, focal weakness, abnormal sensation, neck pain/stiffness, lightheadedness, syncope/LOC, fall. Per family, had delirium "was talking nonsense" a few days ago when she had an episode of UTI, which has since resolved. Has dementia at baseline (alzheimer's vs LBD, per neuro). Family denies acute changes in mental status.     ROS notable for abdominal distension, LLQ abdominal pain upon touch, and R sided torso pain upon touch. Has chronic back pain. Denies leg swelling, flank pain, " diarrhea, blood in stool, flu-like sx.     Hospital/ICU Course:  In the ED, found be hypertensive to 220-230/80-90 (calculated ) upon presentation (noon). Was given hydralazine 50 mg PO x 1 with no improvement: /80 at 4 pm. Was started on nicardipine drip at 4 pm with SBP down to 152/68 at 5:30 pm. Nicardipine drip was paused, followed by BP up to 205/110 at 6:30 pm. Nicardipine drip was resume afterwards.     Also found to have Cr 3.3 (previously 2.4 in 10/2016, 0.8-1.3 in 2015), BUN 32. Bradycardic to 57-62.  (previously 75-200s), troponin negative. EKG showed sinus bradycardia with 1st degree AV block and RBBB. CXR notable for chronic/stable tortuous, atherosclerotic thoracic aorta.     MICU was consulted for hypertensive emergency on nicardipine drip.

## 2017-10-29 NOTE — SUBJECTIVE & OBJECTIVE
Interval History/Significant Events:  Desatted to 90% in ED, was started on 2 L NC. Headache and dizziness improved with nicardipine drip. Slept comfortably through the night with no acute events.    Review of Systems   Constitutional: Positive for appetite change. Negative for chills, diaphoresis and fever.   HENT: Negative for facial swelling, sinus pain and sore throat.    Eyes: Negative for photophobia and visual disturbance.   Respiratory: Negative for shortness of breath.    Cardiovascular: Negative for chest pain and leg swelling.   Gastrointestinal: Positive for abdominal distention and abdominal pain. Negative for blood in stool, constipation, diarrhea, nausea and vomiting.   Genitourinary: Positive for frequency. Negative for decreased urine volume, difficulty urinating, dysuria, flank pain, hematuria and urgency.   Musculoskeletal: Positive for back pain. Negative for gait problem, neck pain and neck stiffness.   Skin: Negative for color change and rash.   Neurological: Negative for dizziness, tremors, syncope, weakness, light-headedness, numbness and headaches.   Psychiatric/Behavioral: Negative for behavioral problems and hallucinations.     Objective:     Vital Signs (Most Recent):  Temp: 98.5 °F (36.9 °C) (10/28/17 1855)  Pulse: (!) 57 (10/28/17 1900)  Resp: 18 (10/28/17 1855)  BP: (!) 143/87 (10/28/17 1855)  SpO2: 96 % (10/28/17 1855) Vital Signs (24h Range):  Temp:  [97.8 °F (36.6 °C)-99.9 °F (37.7 °C)] 98.5 °F (36.9 °C)  Pulse:  [49-63] 57  Resp:  [18-98] 18  SpO2:  [92 %-99 %] 96 %  BP: (127-218)/(55-90) 143/87   Weight: 68.4 kg (150 lb 12.7 oz)  Body mass index is 27.58 kg/m².      Intake/Output Summary (Last 24 hours) at 10/28/17 1948  Last data filed at 10/28/17 1600   Gross per 24 hour   Intake           115.58 ml   Output              450 ml   Net          -334.42 ml       Physical Exam   Constitutional: She appears well-developed and well-nourished. No distress.   HENT:   Head: Normocephalic  and atraumatic.   Eyes: Conjunctivae, EOM and lids are normal.   Neck: Normal range of motion. Neck supple. Carotid bruit is not present.   Cardiovascular: Regular rhythm, S1 normal, S2 normal and intact distal pulses.  Bradycardia present.  Exam reveals no gallop and no friction rub.    No murmur heard.  Pulmonary/Chest: Effort normal and breath sounds normal. No respiratory distress. She has no rales.   Abdominal: Bowel sounds are normal. She exhibits distension. There is tenderness in the left lower quadrant. There is no rebound, no guarding and no CVA tenderness.   Colostomy bag in place, draining, clean, non erythematous   Musculoskeletal:   1+ pitting edema in bilateral shins   Neurological: She is alert.   Oriented to person, place, and event, but not to time   Skin: Skin is warm, dry and intact. No rash noted. She is not diaphoretic.   Psychiatric: Her speech is normal.        Lines/Drains/Airways     Drain                 Colostomy LUQ -- days         Closed/Suction Drain 02/19/15 1509 Abdomen  982 days         Closed/Suction Drain 02/19/15 1509 Abdomen Bulb  982 days         Colostomy 02/19/15 1211  days         Colostomy 03/08/15  days    Female External Urinary Catheter 10/28/17 0600 less than 1 day          Peripheral Intravenous Line                 Peripheral IV - Single Lumen 10/27/17 1310 Right Antecubital 1 day         Peripheral IV - Single Lumen 10/28/17 0408 Left Antecubital less than 1 day              Significant Labs:  Urine studies:   10/27/2017 20:37   Prot/Creat Ratio, Ur 12.07 (H)   Sodium Urine Random 93   Protein, Urine Random 495 (H)   Creatinine, Random Ur 41.0   Urine Urea Nitrogen, Random 249     CMP:   10/28/2017 03:00   Sodium 138   Potassium 3.7   Chloride 106   CO2 22 (L)   Anion Gap 10   BUN, Bld 30 (H)   Creatinine 3.0 (H)   eGFR if non  13.3 (A)   eGFR if African American 15.3 (A)   Glucose 86   Calcium 8.3 (L)   Phosphorus 3.9   Magnesium 2.1    Alkaline Phosphatase 56   Total Protein 5.9 (L)   Albumin 2.2 (L)   Total Bilirubin 0.3   AST 14   ALT 6 (L)     CBC:   10/28/2017 03:00   WBC 8.76   RBC 4.05   Hemoglobin 11.1 (L)   Hematocrit 35.4 (L)   MCV 87   MCH 27.4   MCHC 31.4 (L)   RDW 14.5   Platelets 311   MPV 9.9   Gran% 72.3   Gran # 6.3   Immature Granulocytes 0.3   Immature Grans (Abs) 0.03   Lymph% 13.5 (L)   Lymph # 1.2   Mono% 9.7   Mono # 0.9   Eosinophil% 4.0   Eos # 0.4   Basophil% 0.2   Baso # 0.02   nRBC 0     Cardiac profile:   10/27/2017 23:06   Troponin I 0.029 (H)     Significant Imaging:  CT head without contrast 10/27/17:  Noncontrast CT demonstrates no evidence of acute intracranial pathology.  Senescent changes.    Ultrasound retroperitoneal 10/27/17:  Medical renal disease without hydronephrosis. There is decreased perfusion bilaterally.  Bilateral simple renal cysts.    2D echo with color flow doppler 10/28/17: pending.

## 2017-10-29 NOTE — ASSESSMENT & PLAN NOTE
-IONA with Cr 3.3 on presentation, likely 2/2 HTN emergency vs new baseline for worsening CKD 4. FeUrea 62%, urine protein/creatinine 12, suggesting intrinsic renal disease consistent with hypertensive nephropathy. Renal ultrasound negative for renal artery stenosis. Low suspicion for ATN due to nephrotoxic medications, given patient not chronically on NSAIDs. Pre-renal IONA less likely, given elevated BNP (484) on admission, suggesting volume overload. No signs/sx of pyelonephritis.  -Cr improved to 3.0 on hospital day 1 with control of HTN.  -Continue diurese with home torsemide 20 daily.  -Strict I/O.  -Consider repeating BNP after reaching euvolemia.  -Renally dose meds.  -Avoid nephrotoxins.  -Renal diet.  -CMP daily.

## 2017-10-29 NOTE — PROGRESS NOTES
MD notified of inability to obtain UA specimen related to intermittent incontinence and SBP 170s. Family notified of need of specimen as well. MD notified of pt's son requesting Clonidine initiation and need for LHC. MD stated that she would relay request to team and would follow up w/ family tomorrow AM regarding requests. Daughter at bedside notified of MD response. No new orders at this time. Will continue to monitor.

## 2017-10-29 NOTE — ASSESSMENT & PLAN NOTE
-IONA with Cr 3.3 on presentation, likely 2/2 HTN emergency vs new baseline for worsening CKD 4. FeUrea 62%, urine protein/creatinine 12, suggesting intrinsic renal disease consistent with hypertensive nephropathy. Renal ultrasound negative for renal artery stenosis. Low suspicion for ATN due to nephrotoxic medications, given patient not chronically on NSAIDs. Pre-renal IONA less likely, given elevated BNP (484) on admission, suggesting volume overload. No signs/sx of pyelonephritis.  -Cr improved to 3.0 on hospital day 1 with control of HTN.  -holding torsemide 20 daily given increase in creatinine   -Strict I/O.  -Consider repeating BNP after reaching euvolemia.  -Renally dose meds.  -Avoid nephrotoxins.  -Renal diet.  -CMP daily.  -Need to establish baseline creatinine with PCP

## 2017-10-29 NOTE — PLAN OF CARE
Problem: Patient Care Overview  Goal: Individualization & Mutuality  Hx: HTN, GERD, Bowel obstruction (colostomy 2015), kidney disease  Dx: Hypertensive Crisis    10/27: Arrived from [facility] via EMS; SBP >220; Cardene gtt started; CT and Chest XRay negative  10/28: Admitted to SICU   Outcome: Ongoing (interventions implemented as appropriate)  VSS. O2 sats stable on 2L NC. Pt denies CP, SOB, palpitations, lightheadedness and dizziness. Diuresing per Torsemide PO 20mg Daily. Fall precautions maintained this shift, pt remains free from falls, trauma and injury. POC reviewed with daughter @ bedside, verbalized understanding and translated to pt (pt only speaks Polish.) NADN. Will continue to monitor.

## 2017-10-29 NOTE — PROGRESS NOTES
"[facility]  Critical Care Medicine  Progress Note    Patient Name: Elly Lal  MRN: [id number]  Admission Date: 10/27/2017  Hospital Length of Stay: 1 days  Code Status: DNR  Attending Provider: Mateo Heath MD  Primary Care Provider: Roel Muñoz MD   Principal Problem: <principal problem not specified>    Subjective:     HPI:  89 F with HTN and CKD4 is transferred from nursing home on 10/27/17 for worsening HTN. History obtained from patient and family members.    Per family, her baseline BP is "normal" (120s-130s). For the past 2 weeks, patient has been having SBP in the 170s (early in the morning and throughout the day). A few days ago, SBP went up to the 190s, and her hydralazine 50 mg was increased from TID to QID 3 days ago. This morning, her SBP was 220. She resides in a nursing home and is compliant with her outpatient medication regimen, which includes hydralazine 50 mg QID, nebivolol 10 mg daily, and torsemide 20 daily, all most recently taken this morning prior to presentation. Reports eating salty foods. Per family, has had decreased PO intake for the past 2 weeks because she does not like the food at the nursing home. She has been seeing her outside doctor for HTN and kidney disease (unclear specific diagnosis) for the past few months. Reports urinary frequency. Denies decreased UOP, blood in urine, pain with urination.    She reports headache associated with her HTN for the past 2 week, described as a band wrapped around her head from the lower occipital area to forehead bilaterally. HA occurs 1-2 times daily, worst in the mornings and worse with any kind of movement/sitting upright, improved with lying down/still. No hx of chronic headaches or migraines. Associated sensation of room spinning. Denies n/v, light/sound sensitivity, vision changes, focal weakness, abnormal sensation, neck pain/stiffness, lightheadedness, syncope/LOC, fall. Per family, had delirium "was " "talking nonsense" a few days ago when she had an episode of UTI, which has since resolved. Has dementia at baseline (alzheimer's vs LBD, per neuro). Family denies acute changes in mental status.    ROS notable for abdominal distension, LLQ abdominal pain upon touch, and R sided torso pain upon touch. Has chronic back pain. Denies leg swelling, flank pain, diarrhea, blood in stool, flu-like sx.    Hospital/ICU Course:  In the ED, found be hypertensive to 220-230/80-90 (calculated ) upon presentation (noon). Was given hydralazine 50 mg PO x 1 with no improvement: /80 at 4 pm. Was started on nicardipine drip at 4 pm with SBP down to 152/68 at 5:30 pm. Nicardipine drip was paused, followed by BP up to 205/110 at 6:30 pm. Nicardipine drip was resume afterwards.    Also found to have Cr 3.3 (previously 2.4 in 10/2016, 0.8-1.3 in 2015), BUN 32. Bradycardic to 57-62.  (previously 75-200s), troponin negative. EKG showed sinus bradycardia with 1st degree AV block and RBBB. CXR notable for chronic/stable tortuous, atherosclerotic thoracic aorta. CT head non contrast negative for acute intracranial processes.    MICU was consulted for hypertensive emergency on nicardipine drip. Continued on nicardipine drip overnight with labile BP, varying between 120s-130s/60s-70s when drip was at 5-7.5 mg/hr and 170s-190s/70s-90s when drip was paused. Home hydralazine dose was increased from 50 mg QID to 100 mg TID. Resumed on home nebivolol 10 mg daily. Started on amlodipine 5 mg daily on hospital day 1. FeUrea 62%, urine protein/Cr 12, consistent with hypertensive nephropathy. Renal ultrasound notable for decreased perfusion to kidneys bilaterally. Resumed on torsemide 20 mg daily on hospital day 1.    Interval History/Significant Events:  Desatted to 90% in ED, was started on 2 L NC. Headache and dizziness improved with nicardipine drip. Slept comfortably through the night with no acute events.    Review of Systems "   Constitutional: Positive for appetite change. Negative for chills, diaphoresis and fever.   HENT: Negative for facial swelling, sinus pain and sore throat.    Eyes: Negative for photophobia and visual disturbance.   Respiratory: Negative for shortness of breath.    Cardiovascular: Negative for chest pain and leg swelling.   Gastrointestinal: Positive for abdominal distention and abdominal pain. Negative for blood in stool, constipation, diarrhea, nausea and vomiting.   Genitourinary: Positive for frequency. Negative for decreased urine volume, difficulty urinating, dysuria, flank pain, hematuria and urgency.   Musculoskeletal: Positive for back pain. Negative for gait problem, neck pain and neck stiffness.   Skin: Negative for color change and rash.   Neurological: Negative for dizziness, tremors, syncope, weakness, light-headedness, numbness and headaches.   Psychiatric/Behavioral: Negative for behavioral problems and hallucinations.     Objective:     Vital Signs (Most Recent):  Temp: 98.5 °F (36.9 °C) (10/28/17 1855)  Pulse: (!) 57 (10/28/17 1900)  Resp: 18 (10/28/17 1855)  BP: (!) 143/87 (10/28/17 1855)  SpO2: 96 % (10/28/17 1855) Vital Signs (24h Range):  Temp:  [97.8 °F (36.6 °C)-99.9 °F (37.7 °C)] 98.5 °F (36.9 °C)  Pulse:  [49-63] 57  Resp:  [18-98] 18  SpO2:  [92 %-99 %] 96 %  BP: (127-218)/(55-90) 143/87   Weight: 68.4 kg (150 lb 12.7 oz)  Body mass index is 27.58 kg/m².      Intake/Output Summary (Last 24 hours) at 10/28/17 1948  Last data filed at 10/28/17 1600   Gross per 24 hour   Intake           115.58 ml   Output              450 ml   Net          -334.42 ml       Physical Exam   Constitutional: She appears well-developed and well-nourished. No distress.   HENT:   Head: Normocephalic and atraumatic.   Eyes: Conjunctivae, EOM and lids are normal.   Neck: Normal range of motion. Neck supple. Carotid bruit is not present.   Cardiovascular: Regular rhythm, S1 normal, S2 normal and intact distal  pulses.  Bradycardia present.  Exam reveals no gallop and no friction rub.    No murmur heard.  Pulmonary/Chest: Effort normal and breath sounds normal. No respiratory distress. She has no rales.   Abdominal: Bowel sounds are normal. She exhibits distension. There is tenderness in the left lower quadrant. There is no rebound, no guarding and no CVA tenderness.   Colostomy bag in place, draining, clean, non erythematous   Musculoskeletal:   1+ pitting edema in bilateral shins   Neurological: She is alert.   Oriented to person, place, and event, but not to time   Skin: Skin is warm, dry and intact. No rash noted. She is not diaphoretic.   Psychiatric: Her speech is normal.        Lines/Drains/Airways     Drain                 Colostomy LUQ -- days         Closed/Suction Drain 02/19/15 1509 Abdomen  982 days         Closed/Suction Drain 02/19/15 1509 Abdomen Bulb  982 days         Colostomy 02/19/15 1211  days         Colostomy 03/08/15  days    Female External Urinary Catheter 10/28/17 0600 less than 1 day          Peripheral Intravenous Line                 Peripheral IV - Single Lumen 10/27/17 1310 Right Antecubital 1 day         Peripheral IV - Single Lumen 10/28/17 0408 Left Antecubital less than 1 day              Significant Labs:  Urine studies:   10/27/2017 20:37   Prot/Creat Ratio, Ur 12.07 (H)   Sodium Urine Random 93   Protein, Urine Random 495 (H)   Creatinine, Random Ur 41.0   Urine Urea Nitrogen, Random 249     CMP:   10/28/2017 03:00   Sodium 138   Potassium 3.7   Chloride 106   CO2 22 (L)   Anion Gap 10   BUN, Bld 30 (H)   Creatinine 3.0 (H)   eGFR if non  13.3 (A)   eGFR if African American 15.3 (A)   Glucose 86   Calcium 8.3 (L)   Phosphorus 3.9   Magnesium 2.1   Alkaline Phosphatase 56   Total Protein 5.9 (L)   Albumin 2.2 (L)   Total Bilirubin 0.3   AST 14   ALT 6 (L)     CBC:   10/28/2017 03:00   WBC 8.76   RBC 4.05   Hemoglobin 11.1 (L)   Hematocrit 35.4 (L)   MCV 87    MCH 27.4   MCHC 31.4 (L)   RDW 14.5   Platelets 311   MPV 9.9   Gran% 72.3   Gran # 6.3   Immature Granulocytes 0.3   Immature Grans (Abs) 0.03   Lymph% 13.5 (L)   Lymph # 1.2   Mono% 9.7   Mono # 0.9   Eosinophil% 4.0   Eos # 0.4   Basophil% 0.2   Baso # 0.02   nRBC 0     Cardiac profile:   10/27/2017 23:06   Troponin I 0.029 (H)     Significant Imaging:  CT head without contrast 10/27/17:  Noncontrast CT demonstrates no evidence of acute intracranial pathology.  Senescent changes.    Ultrasound retroperitoneal 10/27/17:  Medical renal disease without hydronephrosis. There is decreased perfusion bilaterally.  Bilateral simple renal cysts.    2D echo with color flow doppler 10/28/17: pending.    Assessment/Plan:     Neuro   Dementia without behavioral disturbance    -No acute changes in mental status from baseline.  -Continue home donepezil.        Headache    -Likely 2/2 HTN, given onset and duration coincide with recent worsening of uncontrolled HTN. Currently resolve after controlling BP with nicardipine drip. CT head on admission negative for acute intracranial processes.  -Continue tylenol PRN.        Psychiatric   Depression    -Currently controlled.  -Continue home citalopram.  -Continue home mirtazapine.        Cardiac/Vascular   Hypertensive emergency    -Meets criteria for hypertensive emergency, given SBP up to 230, DBP up to 110 with Cr 3.3, significantly elevated from 1 year ago. Likely 2/2 worsening kidney functions, given CKD 4, and high salt intake. No acute intracranial bleed on CT head on admission. No signs of HTN cardiomyopathy on EKG on admission, pending 2D echo with color flow doppler for further evaluation.  -Labile BP overnight on nicardipine drip, varying between 120s-130s/60s-70s when drip was at 5-7.5 mg/hr and 170s-190s/70s-90s when drip was paused. MAP 80s-low 100s since 11 pm yesterday.  -Wean off nicardipine drip.  -Resume home hydralazine 50 mg PO QID.  -Increase amlopidine dose to  10 mg PO daily.  -Switch nebivolol to carvedilol 6.25 mg PO BID.        Renal/   Urinary incontinence    -Continue home solifenacin.        CKD (chronic kidney disease) stage 5, GFR less than 15 ml/min    -Rapid decline in kidney function with eGFR 37-58 in 2/2015 to 17 in 10/2016 to 11-13 this admission, consistent with CKD 5. Likely 2/2 HTN.  -Corrected calcium 9.6-9.7, phos WNL, no symptoms of renal osteodystrophy. Consider checking PTH and vitamin D for further evaluation.  -Continue home ergocalciferol.  -Continue home calcitriol.  -Renally dose meds.  -Magnesium labs daily. Supplement PRN for goal > 2.0.        IONA (acute kidney injury)    -IONA with Cr 3.3 on presentation, likely 2/2 HTN emergency vs new baseline for worsening CKD 4. FeUrea 62%, urine protein/creatinine 12, suggesting intrinsic renal disease consistent with hypertensive nephropathy. Renal ultrasound negative for renal artery stenosis. Low suspicion for ATN due to nephrotoxic medications, given patient not chronically on NSAIDs. Pre-renal IONA less likely, given elevated BNP (484) on admission, suggesting volume overload. No signs/sx of pyelonephritis.  -Cr improved to 3.0 on hospital day 1 with control of HTN.  -Continue diurese with home torsemide 20 daily.  -Strict I/O.  -Consider repeating BNP after reaching euvolemia.  -Renally dose meds.  -Avoid nephrotoxins.  -Renal diet.  -CMP daily.        Oncology   Anemia of chronic disease    -Hgb 11.6 on admission, baseline hgb 9-10. Likely 2/2 CKD, given normocytic anemia.    -Continue home ferrous gluconate.  -Continue home erythropoetin Q7days.  -Continue home folate.  -CBC daily.        GI   Colostomy in place    -No signs/sx of leak or infection.  -Daily colostomy bag change per nursing.        Other   Seasonal allergies    -Continue home cetrizine.  -Continue home inhalers PRN.           Critical Care Daily Checklist:    A: Awake: RASS Goal/Actual Goal:    Actual: Gill Agitation Sedation  Scale (RASS): Alert and calm   B: Spontaneous Breathing Trial Performed?     C: SAT & SBT Coordinated?  Does not apply                  D: Delirium: CAM-ICU Overall CAM-ICU: Negative   E: Early Mobility Performed? No   F: Feeding Goal:    Status:     Current Diet Order   Procedures    Diet renal      AS: Analgesia/Sedation None   T: Thromboembolic Prophylaxis Heparin 5000 units subQ Q8h   H: HOB > 300 No   U: Stress Ulcer Prophylaxis (if needed) None indicated   G: Glucose Control None   B: Bowel Function     I: Indwelling Catheter (Lines & Lafleur) Necessity None   D: De-escalation of Antimicrobials/Pharmacotherapies Does not apply    Plan for the day/ETD Step down to floor    Code Status:  Family/Goals of Care: DNR         Critical secondary to Patient has a condition that poses threat to life and bodily function: HTN emergency requiring nicardipine drip.     Critical care was time spent personally by me on the following activities: development of treatment plan with patient or surrogate and bedside caregivers, discussions with consultants, evaluation of patient's response to treatment, examination of patient, ordering and performing treatments and interventions, ordering and review of laboratory studies, ordering and review of radiographic studies, pulse oximetry, re-evaluation of patient's condition. This critical care time did not overlap with that of any other provider or involve time for any procedures.     Melissa Rosenberg MD  Critical Care Medicine  [facility]

## 2017-10-29 NOTE — ASSESSMENT & PLAN NOTE
-Rapid decline in kidney function with eGFR 37-58 in 2/2015 to 17 in 10/2016 to 11-13 this admission, consistent with CKD 5. Likely 2/2 HTN.  -Corrected calcium 9.6-9.7, phos WNL, no symptoms of renal osteodystrophy. Consider checking PTH and vitamin D for further evaluation.  -Continue home ergocalciferol.  -Continue home calcitriol.  -Renally dose meds.  -Magnesium labs daily. Supplement PRN for goal > 2.0.

## 2017-10-29 NOTE — SUBJECTIVE & OBJECTIVE
Interval History: No acute events overnight, patient's headaches have resolved. Still with hypertension, will change hydralazine to 100MG TID and follow BPs. Attempting to contact PCP to establish baseline creatinine.     Review of Systems   Constitutional: Negative for chills and fever.   Respiratory: Negative for chest tightness and shortness of breath.    Cardiovascular: Negative for chest pain and palpitations.   Gastrointestinal: Negative for abdominal distention, abdominal pain, constipation, diarrhea, nausea and vomiting.   Genitourinary: Negative for dysuria.   Neurological: Negative for headaches.     Objective:     Vital Signs (Most Recent):  Temp: 97.9 °F (36.6 °C) (10/29/17 0800)  Pulse: (!) 55 (10/29/17 1300)  Resp: 20 (10/29/17 1300)  BP: (!) 174/73 (10/29/17 1300)  SpO2: (!) 94 % (10/29/17 0800) Vital Signs (24h Range):  Temp:  [97.6 °F (36.4 °C)-98.9 °F (37.2 °C)] 97.9 °F (36.6 °C)  Pulse:  [51-60] 55  Resp:  [17-20] 20  SpO2:  [94 %-99 %] 94 %  BP: (111-183)/(54-87) 174/73     Weight: 68.4 kg (150 lb 12.7 oz)  Body mass index is 27.58 kg/m².    Intake/Output Summary (Last 24 hours) at 10/29/17 1315  Last data filed at 10/29/17 0400   Gross per 24 hour   Intake              240 ml   Output              450 ml   Net             -210 ml      Physical Exam   Constitutional: She appears well-developed and well-nourished. No distress.   HENT:   Head: Normocephalic and atraumatic.   Eyes: Conjunctivae, EOM and lids are normal.   Neck: Normal range of motion. Neck supple. Carotid bruit is not present.   Cardiovascular: Normal rate, regular rhythm, S1 normal, S2 normal and intact distal pulses.  Exam reveals no gallop and no friction rub.    No murmur heard.  Pulmonary/Chest: Effort normal and breath sounds normal. No respiratory distress. She has no rales.   Abdominal: Bowel sounds are normal. She exhibits distension. There is no tenderness. There is no rebound, no guarding and no CVA tenderness.    Colostomy bag in place, draining, clean, non erythematous   Musculoskeletal:   1+ pitting edema in bilateral shins   Neurological: She is alert.   Oriented to person, place, and event, but not to time   Skin: Skin is warm, dry and intact. No rash noted. She is not diaphoretic.   Psychiatric: Her speech is normal.       Significant Labs:   CBC:   Recent Labs  Lab 10/28/17  0300 10/29/17  0458   WBC 8.76 7.44   HGB 11.1* 11.3*   HCT 35.4* 36.4*    326     CMP:   Recent Labs  Lab 10/28/17  0300 10/29/17  0458    138   K 3.7 4.1    105   CO2 22* 25   GLU 86 96   BUN 30* 31*   CREATININE 3.0* 3.3*   CALCIUM 8.3* 8.1*   PROT 5.9*  --    ALBUMIN 2.2*  --    BILITOT 0.3  --    ALKPHOS 56  --    AST 14  --    ALT 6*  --    ANIONGAP 10 8   EGFRNONAA 13.3* 11.8*     Urine Studies: No results for input(s): COLORU, APPEARANCEUA, PHUR, SPECGRAV, PROTEINUA, GLUCUA, KETONESU, BILIRUBINUA, OCCULTUA, NITRITE, UROBILINOGEN, LEUKOCYTESUR, RBCUA, WBCUA, BACTERIA, SQUAMEPITHEL, HYALINECASTS in the last 48 hours.    Invalid input(s): SHANTE    Significant Imaging: I have reviewed all pertinent imaging results/findings within the past 24 hours.

## 2017-10-29 NOTE — ASSESSMENT & PLAN NOTE
-Decline in kidney function with eGFR 37-58 in 2/2015 to 17 in 10/2016 to 11-13 this admission, consistent with CKD 5. Likely 2/2 HTN.  -Corrected calcium 9.6-9.7, phos WNL, no symptoms of renal osteodystrophy. Consider checking PTH and vitamin D for further evaluation.  -Continue home ergocalciferol.  -Continue home calcitriol.  -Renally dose meds.  -Magnesium labs daily  -Need to establish baseline creatinine

## 2017-10-29 NOTE — PLAN OF CARE
Problem: Physical Therapy Goal  Goal: Physical Therapy Goal  Goals to be met by: 11/10/17    Patient will increase functional independence with mobility by performin. Supine to sit with Stand-by Assistance  2. Sit to supine with Stand-by Assistance  3. Sit to stand transfer with Stand-by Assistance with RW.   4. Gait  x 50 feet with Contact guard Assistance using Rolling Walker.   5. Lower extremity exercise program x20 reps per handout, with assistance as needed    Outcome: Ongoing (interventions implemented as appropriate)  Pt chart reviewed and PT evaluation completed- see note for details. POC initiated.     Ama Meier, PT, DPT   10/29/2017  Pager: [phone]

## 2017-10-29 NOTE — ASSESSMENT & PLAN NOTE
-Meets criteria for hypertensive emergency, given SBP up to 230, DBP up to 110 with Cr 3.3, significantly elevated from 1 year ago. Likely 2/2 worsening kidney functions, given CKD 4, and high salt intake. No acute intracranial bleed on CT head on admission. No signs of HTN cardiomyopathy on EKG on admission, pending 2D echo with color flow doppler for further evaluation.  -Labile BP overnight on nicardipine drip, varying between 120s-130s/60s-70s when drip was at 5-7.5 mg/hr and 170s-190s/70s-90s when drip was paused. MAP 80s-low 100s since 11 pm yesterday.  -Weaned off nicardipine drip.  -Hydralazine 100 mg PO TID.  -Amlopidine dose to 10 mg PO daily.  -Carvedilol 6.25 mg PO BID.

## 2017-10-29 NOTE — PT/OT/SLP EVAL
"Physical Therapy  Evaluation    Elly Lal   MRN: [id number]   Admitting Diagnosis: Hypertensive emergency    PT Received On: 10/29/17  PT Start Time: 1420     PT Stop Time: 1436    PT Total Time (min): 16 min       Billable Minutes:  Evaluation 16 min     Diagnosis: Hypertensive emergency    Past Medical History:   Diagnosis Date    Abdominal fistula     Acute renal failure syndrome     Anemia     Anemia     Arthritis     Bowel obstruction 2011    Bowel obstruction     Dementia     Depression     Diverticulitis, colon     Dysphagia     GERD (gastroesophageal reflux disease)     Hernia, abdominal     Hyperlipidemia     Hypertension     Hypertension primary    Hypokalemia     Insomnia     Seasonal allergies       Past Surgical History:   Procedure Laterality Date    COLON SURGERY      alberto's, with hysterectomy    COLOSTOMY         Referring physician: Susie Rosales MD  Date referred to PT: 10/29/17    General Precautions: Standard, fall  Orthopedic Precautions: N/A   Braces: N/A       Do you have any cultural, spiritual, Zoroastrianism conflicts, given your current situation?: unknown    Patient History:  Lives With: facility resident  Living Arrangements: residential facility  Living Environment Comment: Pt lives in [facility]; prior to admit, pt was ambulating "short distances" only with RW 2/2 back pain; pt requires (A) for bathing, but (I) with dressing and feeding.   Equipment Currently Used at Home: walker, rolling (facility equipment)  DME owned (not currently used): RW     Previous Level of Function:  Ambulation Skills: needs device and assist  Transfer Skills: needs device and assist  ADL Skills: needs assist    Subjective:  Communicated with RN prior to session. Pt's daughter at bedside. Pt's primary language is [place].  services offered to pt and pt's daughter- Pt requesting daughter interpret for her instead of using language line or in-person . Pt " agreeable to participate in therapy evaluation.     Chief Complaint: decreased mobility   Patient goals: none stated     Pain/Comfort  Pain Rating 1: 0/10  Pain Rating Post-Intervention 1: 0/10    Objective:   Patient found with: telemetry     Cognitive Exam:  Oriented to: Person, Place and Time    Follows Commands/attention: Follows two-step commands  Communication:fluent in Jamaican; needs    Safety awareness/insight to disability: intact    Physical Exam:  Postural examination/scapula alignment: Rounded shoulder and Head forward    Skin integrity: Visible skin intact  Edema: None noted BLEs    Sensation:   Intact  light/touch BLEs     Lower Extremity Range of Motion:  Right Lower Extremity: WFL  Left Lower Extremity: WFL    Lower Extremity Strength:  Right Lower Extremity: 3+/5 hip flexion, 4-/5 knee flex/ext, 4-/5 ankle pf/df   Left Lower Extremity: 3+/5 hip flexion, 4-/5 knee flex/ext, 4-/5 ankle pf/df     Gross motor coordination: WFL    Functional Mobility:  Bed Mobility:   N/T 2/2 pt sitting up in chair upon PT arrival.     Transfers:  Sit <> Stand Assistance: Minimum Assistance (from bedside chair)  Sit <> Stand Assistive Device: Rolling Walker    Gait:   Gait Distance: 10 ft. with RW and CGA for safety; no LOB; pt with short, shuffled steps; forward flexed posture  Assistance 1: Contact Guard Assistance  Gait Assistive Device: Rolling walker  Gait Pattern: 3-point gait  Gait Deviation(s): decreased wei, decreased step length, decreased stride length, decreased toe-to-floor clearance, decreased weight-shifting ability (forward flexed posture)    Balance:   Static Sit: FAIR+: Able to take MINIMAL challenges from all directions  Dynamic Sit: FAIR+: Maintains balance through MINIMAL excursions of active trunk motion  Static Stand: FAIR: Maintains without assist but unable to take challenges  Dynamic stand: FAIR: Needs CONTACT GUARD during gait    Therapeutic Activities and Exercises:  Pt and  daughter educated on role of PT and POC/goals for therapy as well as safety with mobility. Pt's daughter verbalized understanding. Pt and daughter expressed no further concerns/questions.       AM-PAC 6 CLICK MOBILITY  How much help from another person does this patient currently need?   1 = Unable, Total/Dependent Assistance  2 = A lot, Maximum/Moderate Assistance  3 = A little, Minimum/Contact Guard/Supervision  4 = None, Modified [place]/Independent    Turning over in bed (including adjusting bedclothes, sheets and blankets)?: 3  Sitting down on and standing up from a chair with arms (e.g., wheelchair, bedside commode, etc.): 3  Moving from lying on back to sitting on the side of the bed?: 3  Moving to and from a bed to a chair (including a wheelchair)?: 3  Need to walk in hospital room?: 3  Climbing 3-5 steps with a railing?: 1  Total Score: 16     AM-PAC Raw Score CMS G-Code Modifier Level of Impairment Assistance   6 % Total / Unable   7 - 9 CM 80 - 100% Maximal Assist   10 - 14 CL 60 - 80% Moderate Assist   15 - 19 CK 40 - 60% Moderate Assist   20 - 22 CJ 20 - 40% Minimal Assist   23 CI 1-20% SBA / CGA   24 CH 0% Independent/ Mod I     Patient left up in chair with all lines intact, call button in reach, RN notified and daughter present.    Assessment:   Elly Lal is a 89 y.o. female with a medical diagnosis of Hypertensive emergency. Pt presents with impaired functional mobility, limited endurance for gait, and fall risk due to balance deficits. Pt would benefit from skilled PT intervention to address below listed deficits, reduce fall risk, and maximize (I) and safety with functional mobility. Recommending pt return to NH upon discharge for continued 24/7 assistance/supervision as needed.     Rehab identified problem list/impairments: Rehab identified problem list/impairments: weakness, impaired endurance, gait instability, impaired balance, impaired cardiopulmonary response to  activity    Rehab potential is good.    Activity tolerance: Good    Discharge recommendations: Discharge Facility/Level Of Care Needs:  (return to NH )     Barriers to discharge: Barriers to Discharge: None    Equipment recommendations: Equipment Needed After Discharge: none     GOALS:    Physical Therapy Goals        Problem: Physical Therapy Goal    Goal Priority Disciplines Outcome Goal Variances Interventions   Physical Therapy Goal     PT/OT, PT Ongoing (interventions implemented as appropriate)     Description:  Goals to be met by: 11/10/17    Patient will increase functional independence with mobility by performin. Supine to sit with Stand-by Assistance  2. Sit to supine with Stand-by Assistance  3. Sit to stand transfer with Stand-by Assistance with RW.   4. Gait  x 50 feet with Contact guard Assistance using Rolling Walker.   5. Lower extremity exercise program x20 reps per handout, with assistance as needed                      PLAN:    Patient to be seen 3 x/week to address the above listed problems via gait training, therapeutic activities, therapeutic exercises  Plan of Care expires: 17  Plan of Care reviewed with: patient, daughter        Ama Hardeep, PT, DPT   10/29/2017  Pager: [phone]

## 2017-10-29 NOTE — ASSESSMENT & PLAN NOTE
-Meets criteria for hypertensive emergency, given SBP up to 230, DBP up to 110 with Cr 3.3, significantly elevated from 1 year ago. Likely 2/2 worsening kidney functions, given CKD 4, and high salt intake. No acute intracranial bleed on CT head on admission. No signs of HTN cardiomyopathy on EKG on admission, pending 2D echo with color flow doppler for further evaluation.  -Labile BP overnight on nicardipine drip, varying between 120s-130s/60s-70s when drip was at 5-7.5 mg/hr and 170s-190s/70s-90s when drip was paused. MAP 80s-low 100s since 11 pm yesterday.  -Wean off nicardipine drip.  -Resume home hydralazine 50 mg PO QID.  -Increase amlopidine dose to 10 mg PO daily.  -Switch nebivolol to carvedilol 6.25 mg PO BID.

## 2017-10-29 NOTE — PROGRESS NOTES
MD paged related to pt's daughter requesting stool softener. New orders noted. Will carry out and continue to monitor.

## 2017-10-29 NOTE — HOSPITAL COURSE
10/29/2017- Stepped down to hospital medicine. Still hypertensive overnight up to the 180's systolic, adjusting dose of hydralazine to 100MG TID, will continue to adjust medications as needed. Will attempt to access outside records to establish baseline creatinine, per family patient has reduced renal function. Urinalysis ordered will follow up results.   10/30/2017: nephrology consulted for worsening kidney function

## 2017-10-30 PROBLEM — R51.9 HEADACHE: Status: RESOLVED | Noted: 2017-10-27 | Resolved: 2017-10-30

## 2017-10-30 PROBLEM — N18.9 ACUTE KIDNEY INJURY SUPERIMPOSED ON CKD: Status: ACTIVE | Noted: 2017-10-27

## 2017-10-30 LAB
ANION GAP SERPL CALC-SCNC: 9 MMOL/L
AORTIC VALVE REGURGITATION: ABNORMAL
BASOPHILS # BLD AUTO: 0.01 K/UL
BASOPHILS NFR BLD: 0.1 %
BUN SERPL-MCNC: 36 MG/DL
C3 SERPL-MCNC: 100 MG/DL
C4 SERPL-MCNC: 33 MG/DL
CALCIUM SERPL-MCNC: 8.2 MG/DL
CHLORIDE SERPL-SCNC: 104 MMOL/L
CO2 SERPL-SCNC: 22 MMOL/L
CREAT SERPL-MCNC: 3.7 MG/DL
DIFFERENTIAL METHOD: ABNORMAL
EOSINOPHIL # BLD AUTO: 0.2 K/UL
EOSINOPHIL NFR BLD: 2.4 %
ERYTHROCYTE [DISTWIDTH] IN BLOOD BY AUTOMATED COUNT: 14.5 %
EST. GFR  (AFRICAN AMERICAN): 11.9 ML/MIN/1.73 M^2
EST. GFR  (NON AFRICAN AMERICAN): 10.3 ML/MIN/1.73 M^2
ESTIMATED PA SYSTOLIC PRESSURE: 38.76
GLUCOSE SERPL-MCNC: 90 MG/DL
HCT VFR BLD AUTO: 34 %
HGB BLD-MCNC: 10.6 G/DL
IMM GRANULOCYTES # BLD AUTO: 0.02 K/UL
IMM GRANULOCYTES NFR BLD AUTO: 0.3 %
LYMPHOCYTES # BLD AUTO: 1.5 K/UL
LYMPHOCYTES NFR BLD: 19.8 %
MCH RBC QN AUTO: 27.7 PG
MCHC RBC AUTO-ENTMCNC: 31.2 G/DL
MCV RBC AUTO: 89 FL
MITRAL VALVE MOBILITY: NORMAL
MITRAL VALVE REGURGITATION: ABNORMAL
MITRAL VALVE STENOSIS: ABNORMAL
MONOCYTES # BLD AUTO: 0.8 K/UL
MONOCYTES NFR BLD: 11 %
NEUTROPHILS # BLD AUTO: 5 K/UL
NEUTROPHILS NFR BLD: 66.4 %
NRBC BLD-RTO: 0 /100 WBC
PLATELET # BLD AUTO: 296 K/UL
PMV BLD AUTO: 10.5 FL
POTASSIUM SERPL-SCNC: 4.1 MMOL/L
RBC # BLD AUTO: 3.83 M/UL
RETIRED EF AND QEF - SEE NOTES: 68 (ref 55–65)
SODIUM SERPL-SCNC: 135 MMOL/L
TRICUSPID VALVE REGURGITATION: ABNORMAL
WBC # BLD AUTO: 7.48 K/UL

## 2017-10-30 PROCEDURE — 86255 FLUORESCENT ANTIBODY SCREEN: CPT

## 2017-10-30 PROCEDURE — 36415 COLL VENOUS BLD VENIPUNCTURE: CPT

## 2017-10-30 PROCEDURE — 25000003 PHARM REV CODE 250: Performed by: INTERNAL MEDICINE

## 2017-10-30 PROCEDURE — 97165 OT EVAL LOW COMPLEX 30 MIN: CPT

## 2017-10-30 PROCEDURE — 86160 COMPLEMENT ANTIGEN: CPT

## 2017-10-30 PROCEDURE — 86160 COMPLEMENT ANTIGEN: CPT | Mod: 59

## 2017-10-30 PROCEDURE — 86038 ANTINUCLEAR ANTIBODIES: CPT

## 2017-10-30 PROCEDURE — 86334 IMMUNOFIX E-PHORESIS SERUM: CPT

## 2017-10-30 PROCEDURE — 86235 NUCLEAR ANTIGEN ANTIBODY: CPT | Mod: 59

## 2017-10-30 PROCEDURE — 80048 BASIC METABOLIC PNL TOTAL CA: CPT

## 2017-10-30 PROCEDURE — 99232 SBSQ HOSP IP/OBS MODERATE 35: CPT | Mod: GC,,, | Performed by: HOSPITALIST

## 2017-10-30 PROCEDURE — 99223 1ST HOSP IP/OBS HIGH 75: CPT | Mod: GC,,, | Performed by: INTERNAL MEDICINE

## 2017-10-30 PROCEDURE — A4216 STERILE WATER/SALINE, 10 ML: HCPCS | Performed by: INTERNAL MEDICINE

## 2017-10-30 PROCEDURE — 25000003 PHARM REV CODE 250: Performed by: STUDENT IN AN ORGANIZED HEALTH CARE EDUCATION/TRAINING PROGRAM

## 2017-10-30 PROCEDURE — 84165 PROTEIN E-PHORESIS SERUM: CPT | Mod: 26,,, | Performed by: PATHOLOGY

## 2017-10-30 PROCEDURE — 85025 COMPLETE CBC W/AUTO DIFF WBC: CPT

## 2017-10-30 PROCEDURE — 63600175 PHARM REV CODE 636 W HCPCS: Performed by: INTERNAL MEDICINE

## 2017-10-30 PROCEDURE — 86039 ANTINUCLEAR ANTIBODIES (ANA): CPT

## 2017-10-30 PROCEDURE — 86334 IMMUNOFIX E-PHORESIS SERUM: CPT | Mod: 26,,, | Performed by: PATHOLOGY

## 2017-10-30 PROCEDURE — 97535 SELF CARE MNGMENT TRAINING: CPT

## 2017-10-30 PROCEDURE — 83520 IMMUNOASSAY QUANT NOS NONAB: CPT

## 2017-10-30 PROCEDURE — 80074 ACUTE HEPATITIS PANEL: CPT

## 2017-10-30 PROCEDURE — 20600001 HC STEP DOWN PRIVATE ROOM

## 2017-10-30 PROCEDURE — 84165 PROTEIN E-PHORESIS SERUM: CPT

## 2017-10-30 RX ORDER — TRIAMCINOLONE ACETONIDE 1 MG/G
CREAM TOPICAL 2 TIMES DAILY
Status: DISCONTINUED | OUTPATIENT
Start: 2017-10-30 | End: 2017-10-31 | Stop reason: HOSPADM

## 2017-10-30 RX ORDER — HYDRALAZINE HYDROCHLORIDE 25 MG/1
25 TABLET, FILM COATED ORAL EVERY 8 HOURS
Status: DISCONTINUED | OUTPATIENT
Start: 2017-10-31 | End: 2017-10-31

## 2017-10-30 RX ORDER — ROSUVASTATIN CALCIUM 10 MG/1
10 TABLET, COATED ORAL NIGHTLY
Status: DISCONTINUED | OUTPATIENT
Start: 2017-10-30 | End: 2017-10-31 | Stop reason: HOSPADM

## 2017-10-30 RX ORDER — POLYETHYLENE GLYCOL 3350 17 G/17G
17 POWDER, FOR SOLUTION ORAL DAILY
Status: DISCONTINUED | OUTPATIENT
Start: 2017-10-30 | End: 2017-10-31 | Stop reason: HOSPADM

## 2017-10-30 RX ADMIN — Medication 3 ML: at 02:10

## 2017-10-30 RX ADMIN — ROSUVASTATIN CALCIUM 10 MG: 10 TABLET, FILM COATED ORAL at 10:10

## 2017-10-30 RX ADMIN — HEPARIN SODIUM 5000 UNITS: 5000 INJECTION, SOLUTION INTRAVENOUS; SUBCUTANEOUS at 05:10

## 2017-10-30 RX ADMIN — FOLIC ACID 1 MG: 1 TABLET ORAL at 09:10

## 2017-10-30 RX ADMIN — HYDRALAZINE HYDROCHLORIDE 100 MG: 50 TABLET ORAL at 02:10

## 2017-10-30 RX ADMIN — HEPARIN SODIUM 5000 UNITS: 5000 INJECTION, SOLUTION INTRAVENOUS; SUBCUTANEOUS at 10:10

## 2017-10-30 RX ADMIN — DONEPEZIL HYDROCHLORIDE 10 MG: 10 TABLET, FILM COATED ORAL at 10:10

## 2017-10-30 RX ADMIN — CALCITRIOL 0.5 MCG: 0.25 CAPSULE, LIQUID FILLED ORAL at 09:10

## 2017-10-30 RX ADMIN — POLYETHYLENE GLYCOL 3350 17 G: 17 POWDER, FOR SOLUTION ORAL at 12:10

## 2017-10-30 RX ADMIN — HEPARIN SODIUM 5000 UNITS: 5000 INJECTION, SOLUTION INTRAVENOUS; SUBCUTANEOUS at 02:10

## 2017-10-30 RX ADMIN — MIRTAZAPINE 15 MG: 15 TABLET, FILM COATED ORAL at 10:10

## 2017-10-30 RX ADMIN — Medication 3 ML: at 05:10

## 2017-10-30 RX ADMIN — SOLIFENACIN SUCCINATE 5 MG: 10 TABLET, FILM COATED ORAL at 09:10

## 2017-10-30 RX ADMIN — FERROUS GLUCONATE TAB 324 MG (37.5 MG ELEMENTAL IRON) 324 MG: 324 (37.5 FE) TAB at 12:10

## 2017-10-30 RX ADMIN — DICYCLOMINE HYDROCHLORIDE 10 MG: 10 CAPSULE ORAL at 09:10

## 2017-10-30 RX ADMIN — ISOSORBIDE MONONITRATE 30 MG: 30 TABLET, EXTENDED RELEASE ORAL at 09:10

## 2017-10-30 RX ADMIN — CITALOPRAM HYDROBROMIDE 20 MG: 20 TABLET ORAL at 09:10

## 2017-10-30 RX ADMIN — HYDRALAZINE HYDROCHLORIDE 100 MG: 50 TABLET ORAL at 05:10

## 2017-10-30 RX ADMIN — FERROUS GLUCONATE TAB 324 MG (37.5 MG ELEMENTAL IRON) 324 MG: 324 (37.5 FE) TAB at 09:10

## 2017-10-30 RX ADMIN — HYDRALAZINE HYDROCHLORIDE 100 MG: 50 TABLET ORAL at 10:10

## 2017-10-30 RX ADMIN — FERROUS GLUCONATE TAB 324 MG (37.5 MG ELEMENTAL IRON) 324 MG: 324 (37.5 FE) TAB at 04:10

## 2017-10-30 RX ADMIN — AMLODIPINE BESYLATE 10 MG: 10 TABLET ORAL at 09:10

## 2017-10-30 RX ADMIN — Medication 500 MG: at 09:10

## 2017-10-30 RX ADMIN — CARVEDILOL 6.25 MG: 6.25 TABLET, FILM COATED ORAL at 09:10

## 2017-10-30 RX ADMIN — STANDARDIZED SENNA CONCENTRATE AND DOCUSATE SODIUM 1 TABLET: 8.6; 5 TABLET, FILM COATED ORAL at 09:10

## 2017-10-30 NOTE — ASSESSMENT & PLAN NOTE
-Weaned off nicardipine drip.  -Hydralazine 100 mg PO TID.  -Amlopidine dose to 10 mg PO daily.  -Carvedilol 6.25 mg PO BID.  -imdur 30 mg daily added 10/29; titrate up prn

## 2017-10-30 NOTE — ASSESSMENT & PLAN NOTE
Unclear trigger where it was missing her doses or poor compliancy as she was at NH. Enfd organ damage evident in IONA on CKD.   - REcs to reduce BP by 25% weekly now the BP imporoved but avoid bringing BP to goal to quickly  - decrease Hydralazine to 25 mg TID.  - Continue Carvedilol, Isosorbide Mononitrate, and Amlodipine

## 2017-10-30 NOTE — ASSESSMENT & PLAN NOTE
-IONA with Cr 3.3 on presentation, likely 2/2 HTN emergency vs new baseline for worsening CKD 4.   -baseline unclear, family reports her outpatient nephrologist has informed them she only has 20% kidney function   -FeUrea 62%, urine protein/creatinine 12, suggesting intrinsic renal disease consistent with hypertensive nephropathy.  -Renal ultrasound negative for renal artery stenosis. Shows medical renal disease without hydronephrosis, Bilateral simple renal cysts.  -holding torsemide 20 daily given increase in creatinine. Avoid other nephrotoxins  -Strict I/O.  - nephrology consult placed

## 2017-10-30 NOTE — PLAN OF CARE
Problem: Occupational Therapy Goal  Goal: Occupational Therapy Goal  Goals to be met by: 7 days (11/6/2017)    Patient will increase functional independence with ADLs by performing:    Feeding with [place].  UE Dressing with Set-up Assistance.  LE Dressing with Set-up Assistance.  Grooming while seated EOB with Set-up Assistance.  Toileting from bedside commode with Supervision for hygiene and clothing management.   Rolling to Bilateral with Modified [place].   Supine to sit with Modified [place].  Stand pivot transfers with Supervision.  Toilet transfer to bedside commode with Supervision.    Outcome: Ongoing (interventions implemented as appropriate)  Goals established today     Comments: Cont OT POC

## 2017-10-30 NOTE — PT/OT/SLP EVAL
Occupational Therapy  Evaluation    Elly Lal   MRN: [id number]   Admitting Diagnosis: Hypertensive emergency    OT Date of Treatment: 10/30/17   OT Start Time: 0958  OT Stop Time: 1015  OT Total Time (min): 17 min    Billable Minutes:  Evaluation 8  Self Care/Home Management 9    Diagnosis: Hypertensive emergency     Past Medical History:   Diagnosis Date    Abdominal fistula     Acute renal failure syndrome     Anemia     Anemia     Arthritis     Bowel obstruction 2011    Bowel obstruction     Dementia     Depression     Diverticulitis, colon     Dysphagia     GERD (gastroesophageal reflux disease)     Hernia, abdominal     Hyperlipidemia     Hypertension     Hypertension primary    Hypokalemia     Insomnia     Seasonal allergies       Past Surgical History:   Procedure Laterality Date    COLON SURGERY      alberto's, with hysterectomy    COLOSTOMY         Referring physician: MD Bobby  Date referred to OT: 10/29/2017    General Precautions: Standard,    Orthopedic Precautions: N/A  Braces: N/A    Do you have any cultural, spiritual, Voodoo conflicts, given your current situation?: unknown      Patient History:  Living Environment  Lives With: facility resident  Living Arrangements: residential facility  Living Environment Comment: Pt lives in [facility]. PTA, she was ambulating w/ RW for only short distances 2/2 back pain. Pt requires assist for bathing but was I for all other ADLs.   Equipment Currently Used at Home: walker, rolling (facility equipment)    Prior level of function:   Bed Mobility/Transfers: needs device and assist  Grooming: independent  Bathing: needs device and assist  Upper Body Dressing: independent  Lower Body Dressing: independent  Toileting: independent        Dominant hand: right    Subjective:  Communicated with RN prior to session.  Pt's grandson at bedside. Pt's primary language is Argentine.  services offered to pt and pt's grandson- Pt requesting  lito interpret for her instead of using language line or in-person . Pt agreeable to participate in OT sharda;.   Chief Complaint: decreased functional mobility    Patient/Family stated goals: none were stated    Pain/Comfort  Pain Rating 1: 0/10  Pain Rating Post-Intervention 1: 0/10    Objective:  Patient found with: telemetry    Cognitive Exam:  Oriented to: Person, Place and Time  Follows Commands/attention: Follows two-step commands  Communication: fluent in Danish; needs   Memory:  No Deficits noted  Safety awareness/insight to disability: intact  Coping skills/emotional control: Appropriate to situation    Visual/perceptual:  Intact    Physical Exam:  Postural examination/scapula alignment: Rounded shoulder and Head forward  Skin integrity: Visible skin intact  Edema: None noted BUEs    Sensation:   Intact  light/touch BUEs    Upper Extremity Range of Motion:  Right Upper Extremity: WFL; shoulder flexion to 90 but full PROM for shoulder flexion   Left Upper Extremity: WFL; shoulder flexion to 90 but full PROM for shoulder flexion    Upper Extremity Strength:  Right Upper Extremity: 4/5  Left Upper Extremity: 4/5   Strength: good    Fine motor coordination:   Intact    Gross motor coordination: WFL    Functional Mobility:  Bed Mobility:  Rolling/Turning to Left: Minimum assistance  Scooting/Bridging: Stand by Assistance  Supine to Sit: Minimum Assistance    Transfers:  Sit <> Stand Assistance: Minimum Assistance (verbal cues)  Sit <> Stand Assistive Device: Rolling Walker  Bed <> Chair Technique: Stand Pivot  Bed <> Chair Transfer Assistance: Minimum Assistance (verbal cues)  Bed <> Chair Assistive Device: Rolling Walker    Functional Ambulation: Pt ambulated to bedside chair CGA and Rw; she required verbal cues to keep walker close to her.     Activities of Daily Living:  UE Dressing Level of Assistance: Minimum assistance  LE Dressing Level of Assistance: Minimum  "assistance      Balance:   Static Sit: FAIR+: Able to take MINIMAL challenges from all directions  Dynamic Sit: FAIR+: Maintains balance through MINIMAL excursions of active trunk motion  Static Stand: FAIR: Maintains without assist but unable to take challenges  Dynamic stand: FAIR: Needs CONTACT GUARD during gait    Therapeutic Activities and Exercises:  OT eval completed; pt educated on OT role/POC, OOB activity, safety with functional mobility, and equipment management. Pt completed UB & LB dressing tasks MIN A. Pt completed functional transfers MIN A + verbal cues for hand placement for safety and to keep RW close to her body. Pt is a good candidate for acute OT services at this time. OT recommending pt return to NH upon d/c.     AM-PAC 6 CLICK ADL  How much help from another person does this patient currently need?  1 = Unable, Total/Dependent Assistance  2 = A lot, Maximum/Moderate Assistance  3 = A little, Minimum/Contact Guard/Supervision  4 = None, Modified [place]/Independent    Putting on and taking off regular lower body clothing? : 3  Bathing (including washing, rinsing, drying)?: 2  Toileting, which includes using toilet, bedpan, or urinal? : 2  Putting on and taking off regular upper body clothing?: 3  Taking care of personal grooming such as brushing teeth?: 3  Eating meals?: 4  Total Score: 17    AM-PAC Raw Score CMS "G-Code Modifier Level of Impairment Assistance   6 % Total / Unable   7 - 9 CM 80 - 100% Maximal Assist   10-14 CL 60 - 80% Moderate Assist   15 - 19 CK 40 - 60% Moderate Assist   20 - 22 CJ 20 - 40% Minimal Assist   23 CI 1-20% SBA / CGA   24 CH 0% Independent/ Mod I       Patient left up in chair with call button in reach and grandson present    Assessment:  Elly Lal is a 89 y.o. female with a medical diagnosis of Hypertensive emergency and presents with good BUE ROM and strength facilitating performance in self-care tasks, however, demonstrates impaired balance and " decreased endurance limiting activity tolerance and functional mobility tasks. Pt would benefit from OT services to address the below listed impairments.     Rehab identified problem list/impairments: Rehab identified problem list/impairments: weakness, impaired endurance, gait instability, impaired functional mobilty, impaired self care skills, impaired balance, impaired cardiopulmonary response to activity    Rehab potential is good.    Activity tolerance: Fair    Discharge recommendations: Discharge Facility/Level Of Care Needs:  (Return to NH)     Barriers to discharge: Barriers to Discharge: None    Equipment recommendations: none     GOALS:    Occupational Therapy Goals        Problem: Occupational Therapy Goal    Goal Priority Disciplines Outcome Interventions   Occupational Therapy Goal     OT, PT/OT Ongoing (interventions implemented as appropriate)    Description:  Goals to be met by: 7 days (11/6/2017)    Patient will increase functional independence with ADLs by performing:    Feeding with [place].  UE Dressing with Set-up Assistance.  LE Dressing with Set-up Assistance.  Grooming while seated EOB with Set-up Assistance.  Toileting from bedside commode with Supervision for hygiene and clothing management.   Rolling to Bilateral with Modified [place].   Supine to sit with Modified [place].  Stand pivot transfers with Supervision.  Toilet transfer to bedside commode with Supervision.                      PLAN:  Patient to be seen 3 x/week to address the above listed problems via self-care/home management, therapeutic activities, therapeutic exercises  Plan of Care expires: 11/29/17  Plan of Care reviewed with: patient, grandchild(jesus) (grandson)         Rae Zhou OT  10/30/2017

## 2017-10-30 NOTE — HPI
Elly Lal is a pleasant 89 y.o.  lady from [place] with a PMHX relevant for HTN, HLD, Depression, CKD4 and a colostomy for several years that is not a candidate for being reverse who was transferred from nursing home on 10/27/17 for worsening HTN admitted to  for malignant HTN with HTN Emergency placed on Nicardipine gtt. She is Hungarian speaking only. She has a Hx for CKD followed by a Nephrologist Dr. Jackman Phone # [phone]. 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 During Hospital course it was noted that on transition to PO meds had dramtic decrease in BP from 190 down to 130 SBP in < 24 hrs. Her SBP improved and was brought down in 24-48 hrs. She presented with sCr.of 3.3 improved to 3.0 and then has steadily been rising to 3.3-3.7 today. She is making urine unclear total amount. UA with noevidence of Blood in Urine except for 2 UA's in past that had infectious process concomitantly.

## 2017-10-30 NOTE — PROGRESS NOTES
Bladder scan performed per MD orders.  Max 140cc urine noted.  Pt does not need to void at this time.  Will continue to monitor.

## 2017-10-30 NOTE — PLAN OF CARE
Problem: Patient Care Overview  Goal: Plan of Care Review  Outcome: Ongoing (interventions implemented as appropriate)  BP better controlled this shift with changes to BP medications implemented. VSS. O2 sats stable on RA/2L NC (while sleeping.) Pt denies CP, SOB, palpitations, lightheadedness and dizziness. Torsemide D/C'ed, monitoring Kidney funct. Colostomy in place, clear bag, no stool noted in colostomy, stoma appears red, rosebud in appearance. Clean catch U/A sent off this shift; resulted with WBC. Fall precautions maintained this shift, pt remains free from falls, trauma and injury. POC reviewed with daughter @ bedside, verbalized understanding and translated to pt (pt only speaks [place]ian.) NADN. Will continue to monitor.

## 2017-10-30 NOTE — PROGRESS NOTES
"[facility] Hospital Medicine  Progress Note    Patient Name: Elly Lal  MRN: [id number]  Patient Class: IP- Inpatient   Admission Date: 10/27/2017  Length of Stay: 3 days  Attending Physician: Ron Romero, *  Primary Care Provider: Roel Muñoz MD    [facility] Team: [facility] Susie Rosales MD    Subjective:     Principal Problem:Hypertensive emergency    HPI:  89 F with HTN and CKD4 is transferred from nursing home on 10/27/17 for worsening HTN. History obtained from patient and family members.     Per family, her baseline BP is "normal" (120s-130s). For the past 2 weeks, patient has been having SBP in the 170s (early in the morning and throughout the day). A few days ago, SBP went up to the 190s, and her hydralazine 50 mg was increased from TID to QID 3 days ago. This morning, her SBP was 220. She resides in a nursing home and is compliant with her outpatient medication regimen, which includes hydralazine 50 mg QID, nebivolol 10 mg daily, and torsemide 20 daily, all most recently taken this morning prior to presentation. Reports eating salty foods. Per family, has had decreased PO intake for the past 2 weeks because she does not like the food at the nursing home. She has been seeing her outside doctor for HTN and kidney disease (unclear specific diagnosis) for the past few months. Reports urinary frequency. Denies decreased UOP, blood in urine, pain with urination.     She reports headache associated with her HTN for the past 2 week, described as a band wrapped around her head from the lower occipital area to forehead bilaterally. HA occurs 1-2 times daily, worst in the mornings and worse with any kind of movement/sitting upright, improved with lying down/still. No hx of chronic headaches or migraines. Associated sensation of room spinning. Denies n/v, light/sound sensitivity, vision changes, focal weakness, abnormal sensation, neck pain/stiffness, " "lightheadedness, syncope/LOC, fall. Per family, had delirium "was talking nonsense" a few days ago when she had an episode of UTI, which has since resolved. Has dementia at baseline (alzheimer's vs LBD, per neuro). Family denies acute changes in mental status.     ROS notable for abdominal distension, LLQ abdominal pain upon touch, and R sided torso pain upon touch. Has chronic back pain. Denies leg swelling, flank pain, diarrhea, blood in stool, flu-like sx.     Hospital/ICU Course:  In the ED, found be hypertensive to 220-230/80-90 (calculated ) upon presentation (noon). Was given hydralazine 50 mg PO x 1 with no improvement: /80 at 4 pm. Was started on nicardipine drip at 4 pm with SBP down to 152/68 at 5:30 pm. Nicardipine drip was paused, followed by BP up to 205/110 at 6:30 pm. Nicardipine drip was resume afterwards.     Also found to have Cr 3.3 (previously 2.4 in 10/2016, 0.8-1.3 in 2015), BUN 32. Bradycardic to 57-62.  (previously 75-200s), troponin negative. EKG showed sinus bradycardia with 1st degree AV block and RBBB. CXR notable for chronic/stable tortuous, atherosclerotic thoracic aorta.     MICU was consulted for hypertensive emergency on nicardipine drip.    Hospital Course:  10/29/2017- Stepped down to hospital medicine. Still hypertensive overnight up to the 180's systolic, adjusting dose of hydralazine to 100MG TID, will continue to adjust medications as needed. Will attempt to access outside records to establish baseline creatinine, per family patient has reduced renal function. Urinalysis ordered will follow up results.   10/30/2017: nephrology consulted for worsening kidney function      Interval History: imdur added yesterday evening for consistently elevated BPs; also worsening creatinine overnight    Review of Systems   Constitutional: Negative for chills and fever.   Respiratory: Negative for chest tightness and shortness of breath.    Cardiovascular: Negative for chest " pain and palpitations.   Gastrointestinal: Negative for abdominal pain, constipation, diarrhea, nausea and vomiting.   Genitourinary: Positive for decreased urine volume. Negative for dysuria.   Neurological: Negative for headaches.     Objective:     Vital Signs (Most Recent):  Temp: 98.2 °F (36.8 °C) (10/30/17 1129)  Pulse: (!) 54 (10/30/17 1200)  Resp: 18 (10/30/17 1129)  BP: (!) 125/58 (10/30/17 1129)  SpO2: 99 % (10/30/17 1129) Vital Signs (24h Range):  Temp:  [97.9 °F (36.6 °C)-99.4 °F (37.4 °C)] 98.2 °F (36.8 °C)  Pulse:  [51-62] 54  Resp:  [18] 18  SpO2:  [90 %-99 %] 99 %  BP: (125-171)/() 125/58     Weight: 73.5 kg (162 lb 0.6 oz)  Body mass index is 29.64 kg/m².    Intake/Output Summary (Last 24 hours) at 10/30/17 1328  Last data filed at 10/30/17 0600   Gross per 24 hour   Intake              240 ml   Output              225 ml   Net               15 ml      Physical Exam   Constitutional: She appears well-developed and well-nourished. No distress.   HENT:   Head: Normocephalic and atraumatic.   Eyes: Conjunctivae, EOM and lids are normal.   Neck: Normal range of motion. Neck supple. Carotid bruit is not present.   Cardiovascular: Normal rate, regular rhythm, S1 normal, S2 normal and intact distal pulses.  Exam reveals no gallop and no friction rub.    No murmur heard.  Pulmonary/Chest: Effort normal and breath sounds normal. No respiratory distress. She has no rales.   Abdominal: Bowel sounds are normal. She exhibits distension. There is no tenderness. There is no rebound, no guarding and no CVA tenderness.   Colostomy bag in place, draining, clean, non erythematous   Musculoskeletal:   1+ pitting edema in bilateral shins   Neurological: She is alert.   Oriented to person, place, and event, but not to time   Skin: Skin is warm, dry and intact. No rash noted. She is not diaphoretic.   Psychiatric: Her speech is normal.   Vitals reviewed.      Significant Labs: All pertinent labs within the past 24  hours have been reviewed.    Significant Imaging: I have reviewed all pertinent imaging results/findings within the past 24 hours.    Assessment/Plan:      * Hypertensive emergency    -Weaned off nicardipine drip.  -Hydralazine 100 mg PO TID.  -Amlopidine dose to 10 mg PO daily.  -Carvedilol 6.25 mg PO BID.  -imdur 30 mg daily added 10/29; titrate up prn        Acute kidney injury superimposed on CKD    -IONA with Cr 3.3 on presentation, likely 2/2 HTN emergency vs new baseline for worsening CKD 4.   -baseline unclear, family reports her outpatient nephrologist has informed them she only has 20% kidney function   -FeUrea 62%, urine protein/creatinine 12, suggesting intrinsic renal disease consistent with hypertensive nephropathy.  -Renal ultrasound negative for renal artery stenosis. Shows medical renal disease without hydronephrosis, Bilateral simple renal cysts.  -holding torsemide 20 daily given increase in creatinine. Avoid other nephrotoxins  -Strict I/O.  - nephrology consult placed          Colostomy in place    -No signs/sx of leak or infection.  -Daily colostomy bag change per nursing          Urinary incontinence    -Continue home solifenacin.        Depression    -Currently controlled.  -Continue home citalopram.  -Continue home mirtazapine.          Anemia of chronic disease    -Hgb 11.6 on admission, baseline hgb 9-10. Likely 2/2 CKD, given normocytic anemia.    -Continue home ferrous gluconate.  -Continue home erythropoetin Q7days.  -Continue home folate.  -CBC daily          CKD (chronic kidney disease) stage 5, GFR less than 15 ml/min    .            VTE Risk Mitigation         Ordered     heparin (porcine) injection 5,000 Units  Every 8 hours     Route:  Subcutaneous        10/27/17 2134     Medium Risk of VTE  Once      10/27/17 2134              Susie Rosales MD  Department of Hospital Medicine   [facility]

## 2017-10-30 NOTE — PLAN OF CARE
Extended Emergency Contact Information  Primary Emergency Contact: Jax Clark  Address: [address]        [facility]  Home Phone: [phone]  Relation: Daughter  Secondary Emergency Contact: Dana Archibald   [place]  Mobile Phone: [phone]  Relation: Daughter    Roel TRETNON Muñoz MD  [address] [address] / [place] [id number]    No future appointments.    Payor: MEDICARE / Plan: MEDICARE PART A & B / Product Type: Government /     No Pharmacies Listed       10/30/17 1518   Discharge Assessment   Assessment Type Discharge Planning Assessment   Confirmed/corrected address and phone number on facesheet? Yes   Assessment information obtained from? Caregiver;Medical Record   Expected Length of Stay (days) 4   Communicated expected length of stay with patient/caregiver yes   Prior to hospitilization cognitive status: Alert/Oriented   Prior to hospitalization functional status: Needs Assistance;Assistive Equipment   Current cognitive status: Alert/Oriented   Current Functional Status: Needs Assistance;Assistive Equipment   Lives With facility resident   Able to Return to Prior Arrangements no   Is patient able to care for self after discharge? No   Patient's perception of discharge disposition nursing home   Readmission Within The Last 30 Days no previous admission in last 30 days   Patient currently being followed by outpatient case management? No   Patient currently receives any other outside agency services? No   Equipment Currently Used at Home walker, rolling   Do you have any problems affording any of your prescribed medications? No   Is the patient taking medications as prescribed? yes   Does the patient have transportation home? Yes   Transportation Available family or friend will provide   Does the patient receive services at the [facility]? No   Discharge Plan A Return to nursing home   Discharge Plan B Return to Nursing Home   Patient/Family In Agreement With  Plan yes   Readmission Questionnaire   Living Arrangements residential facility   Have you felt down, depressed, or hopeless? Unable to Assess

## 2017-10-30 NOTE — CONSULTS
Consult received. Patient seen examined and chart reviewed. Please see full consult note with recs to follow. Case discussed with primary team and Staff.   Joseph Tapia MD  Nephrology Fellow PGY4  [phone]

## 2017-10-30 NOTE — SUBJECTIVE & OBJECTIVE
Interval History: imdur added yesterday evening for consistently elevated BPs; also worsening creatinine overnight    Review of Systems   Constitutional: Negative for chills and fever.   Respiratory: Negative for chest tightness and shortness of breath.    Cardiovascular: Negative for chest pain and palpitations.   Gastrointestinal: Negative for abdominal pain, constipation, diarrhea, nausea and vomiting.   Genitourinary: Positive for decreased urine volume. Negative for dysuria.   Neurological: Negative for headaches.     Objective:     Vital Signs (Most Recent):  Temp: 98.2 °F (36.8 °C) (10/30/17 1129)  Pulse: (!) 54 (10/30/17 1200)  Resp: 18 (10/30/17 1129)  BP: (!) 125/58 (10/30/17 1129)  SpO2: 99 % (10/30/17 1129) Vital Signs (24h Range):  Temp:  [97.9 °F (36.6 °C)-99.4 °F (37.4 °C)] 98.2 °F (36.8 °C)  Pulse:  [51-62] 54  Resp:  [18] 18  SpO2:  [90 %-99 %] 99 %  BP: (125-171)/() 125/58     Weight: 73.5 kg (162 lb 0.6 oz)  Body mass index is 29.64 kg/m².    Intake/Output Summary (Last 24 hours) at 10/30/17 1328  Last data filed at 10/30/17 0600   Gross per 24 hour   Intake              240 ml   Output              225 ml   Net               15 ml      Physical Exam   Constitutional: She appears well-developed and well-nourished. No distress.   HENT:   Head: Normocephalic and atraumatic.   Eyes: Conjunctivae, EOM and lids are normal.   Neck: Normal range of motion. Neck supple. Carotid bruit is not present.   Cardiovascular: Normal rate, regular rhythm, S1 normal, S2 normal and intact distal pulses.  Exam reveals no gallop and no friction rub.    No murmur heard.  Pulmonary/Chest: Effort normal and breath sounds normal. No respiratory distress. She has no rales.   Abdominal: Bowel sounds are normal. She exhibits distension. There is no tenderness. There is no rebound, no guarding and no CVA tenderness.   Colostomy bag in place, draining, clean, non erythematous   Musculoskeletal:   1+ pitting edema in  bilateral shins   Neurological: She is alert.   Oriented to person, place, and event, but not to time   Skin: Skin is warm, dry and intact. No rash noted. She is not diaphoretic.   Psychiatric: Her speech is normal.   Vitals reviewed.      Significant Labs: All pertinent labs within the past 24 hours have been reviewed.    Significant Imaging: I have reviewed all pertinent imaging results/findings within the past 24 hours.

## 2017-10-30 NOTE — SUBJECTIVE & OBJECTIVE
Past Medical History:   Diagnosis Date    Abdominal fistula     Acute renal failure syndrome     Anemia     Anemia     Arthritis     Bowel obstruction 2011    Bowel obstruction     Dementia     Depression     Diverticulitis, colon     Dysphagia     GERD (gastroesophageal reflux disease)     Hernia, abdominal     Hyperlipidemia     Hypertension     Hypertension primary    Hypokalemia     Insomnia     Seasonal allergies        Past Surgical History:   Procedure Laterality Date    COLON SURGERY      alberto's, with hysterectomy    COLOSTOMY         Review of patient's allergies indicates:  No Known Allergies  Current Facility-Administered Medications   Medication Frequency    acetaminophen tablet 650 mg Q4H PRN    albuterol-ipratropium 2.5mg-0.5mg/3mL nebulizer solution 3 mL Q4H PRN    amLODIPine tablet 10 mg Daily    ascorbic acid (vitamin C) tablet 500 mg Daily    calcitRIOL capsule 0.5 mcg Daily    carvedilol tablet 6.25 mg BID    citalopram tablet 20 mg Daily    dicyclomine capsule 10 mg Daily    donepezil tablet 10 mg QHS    ergocalciferol capsule 50,000 Units Q7 Days    ferrous gluconate tablet 324 mg TID WM    folic acid tablet 1 mg Daily    heparin (porcine) injection 5,000 Units Q8H    hydrALAZINE tablet 100 mg Q8H    isosorbide mononitrate 24 hr tablet 30 mg Daily    magnesium sulfate 2g in water 50mL IVPB (premix) PRN    magnesium sulfate 2g in water 50mL IVPB (premix) PRN    meclizine tablet 12.5 mg TID PRN    mirtazapine tablet 15 mg QHS    ondansetron injection 8 mg Q8H PRN    polyethylene glycol packet 17 g Daily    rosuvastatin tablet 10 mg QHS    senna-docusate 8.6-50 mg per tablet 1 tablet Daily    sodium chloride 0.9% flush 3 mL Q8H    solifenacin tablet 5 mg Daily    traMADol tablet 50 mg Q6H PRN    triamcinolone acetonide 0.1% cream BID     Family History     None        Social History Main Topics    Smoking status: Never Smoker    Smokeless  tobacco: Never Used    Alcohol use No    Drug use: No    Sexual activity: Not Currently     Review of Systems   Constitutional: Positive for appetite change and fatigue. Negative for fever and unexpected weight change.   HENT: Positive for tinnitus. Negative for facial swelling and hearing loss.    Eyes: Negative for visual disturbance.   Respiratory: Negative for chest tightness and shortness of breath.    Cardiovascular: Negative for chest pain and leg swelling.   Gastrointestinal: Negative for abdominal pain and nausea.   Genitourinary: Negative for difficulty urinating, dysuria and flank pain.   Musculoskeletal: Negative for arthralgias and myalgias.   Skin: Negative for color change.   Neurological: Positive for weakness, light-headedness and headaches. Negative for dizziness and syncope.   Psychiatric/Behavioral: Positive for confusion. Negative for behavioral problems and hallucinations.     Objective:     Vital Signs (Most Recent):  Temp: 97.9 °F (36.6 °C) (10/30/17 1626)  Pulse: (!) 59 (10/30/17 1626)  Resp: 16 (10/30/17 1626)  BP: (!) 124/57 (10/30/17 1626)  SpO2: (!) 91 % (10/30/17 1626)  O2 Device (Oxygen Therapy): room air (10/30/17 1450) Vital Signs (24h Range):  Temp:  [97.9 °F (36.6 °C)-99.4 °F (37.4 °C)] 97.9 °F (36.6 °C)  Pulse:  [51-62] 59  Resp:  [16-18] 16  SpO2:  [90 %-99 %] 91 %  BP: (124-165)/() 124/57     Weight: 73.5 kg (162 lb 0.6 oz) (10/30/17 0700)  Body mass index is 29.64 kg/m².  Body surface area is 1.79 meters squared.    I/O last 3 completed shifts:  In: 900 [P.O.:900]  Out: 225 [Urine:225]    Physical Exam   Constitutional: She is oriented to person, place, and time. She appears well-developed and well-nourished. No distress.   HENT:   Head: Normocephalic and atraumatic.   Eyes: Conjunctivae are normal. Pupils are equal, round, and reactive to light.   Neck: Trachea normal and normal range of motion. Neck supple. No JVD present.   Cardiovascular: Normal rate, regular  rhythm, S1 normal, S2 normal, intact distal pulses and normal pulses.  Exam reveals no gallop and no friction rub.    Murmur heard.  Pulmonary/Chest: Effort normal and breath sounds normal. She has no wheezes. She has no rales.   Abdominal: Soft. Bowel sounds are normal. She exhibits no distension. There is no tenderness.   Musculoskeletal: Normal range of motion. She exhibits no edema.   Neurological: She is alert and oriented to person, place, and time.   Skin: Skin is warm and dry. Capillary refill takes less than 2 seconds.   Psychiatric: She has a normal mood and affect. Her behavior is normal.   Vitals reviewed.      Significant Labs:  BMP:   Recent Labs  Lab 10/29/17  0757 10/30/17  0442   GLU  --  90   CL  --  104   CO2  --  22*   BUN  --  36*   CREATININE  --  3.7*   CALCIUM  --  8.2*   MG 2.1  --      Cardiac Markers: No results for input(s): CKMB, TROPONINT, MYOGLOBIN in the last 168 hours.  CBC:   Recent Labs  Lab 10/30/17  0442   WBC 7.48   RBC 3.83*   HGB 10.6*   HCT 34.0*      MCV 89   MCH 27.7   MCHC 31.2*     CMP:   Recent Labs  Lab 10/28/17  0300  10/30/17  0442   GLU 86  < > 90   CALCIUM 8.3*  < > 8.2*   ALBUMIN 2.2*  --   --    PROT 5.9*  --   --      < > 135*   K 3.7  < > 4.1   CO2 22*  < > 22*     < > 104   BUN 30*  < > 36*   CREATININE 3.0*  < > 3.7*   ALKPHOS 56  --   --    ALT 6*  --   --    AST 14  --   --    BILITOT 0.3  --   --    < > = values in this interval not displayed.  Coagulation: No results for input(s): INR, APTT in the last 168 hours.    Invalid input(s): PT  LFTs:   Recent Labs  Lab 10/28/17  0300   ALT 6*   AST 14   ALKPHOS 56   BILITOT 0.3   PROT 5.9*   ALBUMIN 2.2*       Recent Labs  Lab 10/29/17  2014   COLORU Yellow   SPECGRAV 1.015   PHUR 5.0   PROTEINUA 3+*   BACTERIA Occasional   NITRITE Negative   LEUKOCYTESUR Negative   UROBILINOGEN Negative   HYALINECASTS 0     All labs within the past 24 hours have been reviewed.    Significant Imaging:  Labs:  Reviewed  ECG: Reviewed  X-Ray: Reviewed

## 2017-10-31 VITALS
BODY MASS INDEX: 29.78 KG/M2 | HEART RATE: 62 BPM | HEIGHT: 62 IN | DIASTOLIC BLOOD PRESSURE: 57 MMHG | RESPIRATION RATE: 18 BRPM | OXYGEN SATURATION: 94 % | SYSTOLIC BLOOD PRESSURE: 113 MMHG | WEIGHT: 161.81 LBS | TEMPERATURE: 99 F

## 2017-10-31 PROBLEM — I16.1 HYPERTENSIVE EMERGENCY: Status: RESOLVED | Noted: 2017-10-27 | Resolved: 2017-10-31

## 2017-10-31 PROBLEM — R80.8 OTHER PROTEINURIA: Status: ACTIVE | Noted: 2017-10-31

## 2017-10-31 LAB
ALBUMIN SERPL ELPH-MCNC: 2.59 G/DL
ALPHA1 GLOB SERPL ELPH-MCNC: 0.35 G/DL
ALPHA2 GLOB SERPL ELPH-MCNC: 0.93 G/DL
ANA SER QL IF: POSITIVE
ANA TITR SER IF: NORMAL {TITER}
ANION GAP SERPL CALC-SCNC: 8 MMOL/L
B-GLOBULIN SERPL ELPH-MCNC: 0.71 G/DL
BASOPHILS # BLD AUTO: 0.03 K/UL
BASOPHILS NFR BLD: 0.4 %
BUN SERPL-MCNC: 39 MG/DL
CALCIUM SERPL-MCNC: 8.2 MG/DL
CHLORIDE SERPL-SCNC: 105 MMOL/L
CO2 SERPL-SCNC: 24 MMOL/L
CREAT SERPL-MCNC: 3.7 MG/DL
DIFFERENTIAL METHOD: ABNORMAL
EOSINOPHIL # BLD AUTO: 0.2 K/UL
EOSINOPHIL NFR BLD: 2.5 %
ERYTHROCYTE [DISTWIDTH] IN BLOOD BY AUTOMATED COUNT: 14.3 %
EST. GFR  (AFRICAN AMERICAN): 11.9 ML/MIN/1.73 M^2
EST. GFR  (NON AFRICAN AMERICAN): 10.3 ML/MIN/1.73 M^2
GAMMA GLOB SERPL ELPH-MCNC: 1.22 G/DL
GLUCOSE SERPL-MCNC: 89 MG/DL
HAV IGM SERPL QL IA: NEGATIVE
HBV CORE IGM SERPL QL IA: NEGATIVE
HBV SURFACE AG SERPL QL IA: NEGATIVE
HCT VFR BLD AUTO: 34.2 %
HCV AB SERPL QL IA: NEGATIVE
HGB BLD-MCNC: 10.6 G/DL
IMM GRANULOCYTES # BLD AUTO: 0.03 K/UL
IMM GRANULOCYTES NFR BLD AUTO: 0.4 %
KAPPA LC SER QL IA: 13.45 MG/DL
KAPPA LC/LAMBDA SER IA: 0.91
LAMBDA LC SER QL IA: 14.83 MG/DL
LYMPHOCYTES # BLD AUTO: 1.4 K/UL
LYMPHOCYTES NFR BLD: 17.9 %
MAGNESIUM SERPL-MCNC: 2.4 MG/DL
MCH RBC QN AUTO: 27.3 PG
MCHC RBC AUTO-ENTMCNC: 31 G/DL
MCV RBC AUTO: 88 FL
MONOCYTES # BLD AUTO: 0.8 K/UL
MONOCYTES NFR BLD: 9.8 %
NEUTROPHILS # BLD AUTO: 5.3 K/UL
NEUTROPHILS NFR BLD: 69 %
NRBC BLD-RTO: 0 /100 WBC
PHOSPHATE SERPL-MCNC: 4.4 MG/DL
PLATELET # BLD AUTO: 293 K/UL
PMV BLD AUTO: 10.5 FL
POTASSIUM SERPL-SCNC: 4.3 MMOL/L
PROT SERPL-MCNC: 5.8 G/DL
RBC # BLD AUTO: 3.88 M/UL
SODIUM SERPL-SCNC: 137 MMOL/L
WBC # BLD AUTO: 7.69 K/UL

## 2017-10-31 PROCEDURE — 25000003 PHARM REV CODE 250: Performed by: STUDENT IN AN ORGANIZED HEALTH CARE EDUCATION/TRAINING PROGRAM

## 2017-10-31 PROCEDURE — 97535 SELF CARE MNGMENT TRAINING: CPT

## 2017-10-31 PROCEDURE — 84100 ASSAY OF PHOSPHORUS: CPT

## 2017-10-31 PROCEDURE — 85025 COMPLETE CBC W/AUTO DIFF WBC: CPT

## 2017-10-31 PROCEDURE — 80048 BASIC METABOLIC PNL TOTAL CA: CPT

## 2017-10-31 PROCEDURE — 25000003 PHARM REV CODE 250: Performed by: INTERNAL MEDICINE

## 2017-10-31 PROCEDURE — 99239 HOSP IP/OBS DSCHRG MGMT >30: CPT | Mod: GC,,, | Performed by: HOSPITALIST

## 2017-10-31 PROCEDURE — 97116 GAIT TRAINING THERAPY: CPT

## 2017-10-31 PROCEDURE — 63600175 PHARM REV CODE 636 W HCPCS: Performed by: INTERNAL MEDICINE

## 2017-10-31 PROCEDURE — 99232 SBSQ HOSP IP/OBS MODERATE 35: CPT | Mod: GC,,, | Performed by: INTERNAL MEDICINE

## 2017-10-31 PROCEDURE — 83735 ASSAY OF MAGNESIUM: CPT

## 2017-10-31 RX ORDER — AMLODIPINE BESYLATE 10 MG/1
10 TABLET ORAL DAILY
Qty: 30 TABLET | Refills: 11 | Status: SHIPPED | OUTPATIENT
Start: 2017-11-01 | End: 2018-11-01

## 2017-10-31 RX ORDER — ISOSORBIDE MONONITRATE 60 MG/1
60 TABLET, EXTENDED RELEASE ORAL DAILY
Qty: 30 TABLET | Refills: 11 | Status: SHIPPED | OUTPATIENT
Start: 2017-11-01 | End: 2018-11-01

## 2017-10-31 RX ORDER — CARVEDILOL 12.5 MG/1
12.5 TABLET ORAL 2 TIMES DAILY
Qty: 60 TABLET | Refills: 11 | Status: SHIPPED | OUTPATIENT
Start: 2017-10-31 | End: 2018-10-31

## 2017-10-31 RX ORDER — TRAMADOL HYDROCHLORIDE 50 MG/1
50 TABLET ORAL EVERY 4 HOURS PRN
Qty: 42 TABLET | Refills: 0 | Status: SHIPPED | OUTPATIENT
Start: 2017-10-31

## 2017-10-31 RX ORDER — ISOSORBIDE MONONITRATE 60 MG/1
60 TABLET, EXTENDED RELEASE ORAL DAILY
Status: DISCONTINUED | OUTPATIENT
Start: 2017-11-01 | End: 2017-10-31 | Stop reason: HOSPADM

## 2017-10-31 RX ORDER — CARVEDILOL 12.5 MG/1
12.5 TABLET ORAL 2 TIMES DAILY
Status: DISCONTINUED | OUTPATIENT
Start: 2017-10-31 | End: 2017-10-31 | Stop reason: HOSPADM

## 2017-10-31 RX ORDER — HYDRALAZINE HYDROCHLORIDE 50 MG/1
50 TABLET, FILM COATED ORAL EVERY 8 HOURS
Status: DISCONTINUED | OUTPATIENT
Start: 2017-10-31 | End: 2017-10-31 | Stop reason: HOSPADM

## 2017-10-31 RX ADMIN — HYDRALAZINE HYDROCHLORIDE 25 MG: 25 TABLET, FILM COATED ORAL at 06:10

## 2017-10-31 RX ADMIN — POLYETHYLENE GLYCOL 3350 17 G: 17 POWDER, FOR SOLUTION ORAL at 08:10

## 2017-10-31 RX ADMIN — CITALOPRAM HYDROBROMIDE 20 MG: 20 TABLET ORAL at 08:10

## 2017-10-31 RX ADMIN — STANDARDIZED SENNA CONCENTRATE AND DOCUSATE SODIUM 1 TABLET: 8.6; 5 TABLET, FILM COATED ORAL at 08:10

## 2017-10-31 RX ADMIN — HYDRALAZINE HYDROCHLORIDE 50 MG: 50 TABLET ORAL at 02:10

## 2017-10-31 RX ADMIN — FERROUS GLUCONATE TAB 324 MG (37.5 MG ELEMENTAL IRON) 324 MG: 324 (37.5 FE) TAB at 01:10

## 2017-10-31 RX ADMIN — FERROUS GLUCONATE TAB 324 MG (37.5 MG ELEMENTAL IRON) 324 MG: 324 (37.5 FE) TAB at 08:10

## 2017-10-31 RX ADMIN — CARVEDILOL 6.25 MG: 6.25 TABLET, FILM COATED ORAL at 08:10

## 2017-10-31 RX ADMIN — CALCITRIOL 0.5 MCG: 0.25 CAPSULE, LIQUID FILLED ORAL at 08:10

## 2017-10-31 RX ADMIN — TRIAMCINOLONE ACETONIDE: 1 CREAM TOPICAL at 08:10

## 2017-10-31 RX ADMIN — FOLIC ACID 1 MG: 1 TABLET ORAL at 08:10

## 2017-10-31 RX ADMIN — ISOSORBIDE MONONITRATE 30 MG: 30 TABLET, EXTENDED RELEASE ORAL at 08:10

## 2017-10-31 RX ADMIN — AMLODIPINE BESYLATE 10 MG: 10 TABLET ORAL at 08:10

## 2017-10-31 RX ADMIN — HEPARIN SODIUM 5000 UNITS: 5000 INJECTION, SOLUTION INTRAVENOUS; SUBCUTANEOUS at 02:10

## 2017-10-31 RX ADMIN — SOLIFENACIN SUCCINATE 5 MG: 10 TABLET, FILM COATED ORAL at 10:10

## 2017-10-31 RX ADMIN — DICYCLOMINE HYDROCHLORIDE 10 MG: 10 CAPSULE ORAL at 08:10

## 2017-10-31 RX ADMIN — Medication 500 MG: at 08:10

## 2017-10-31 RX ADMIN — HEPARIN SODIUM 5000 UNITS: 5000 INJECTION, SOLUTION INTRAVENOUS; SUBCUTANEOUS at 06:10

## 2017-10-31 NOTE — ASSESSMENT & PLAN NOTE
Unclear trigger where it was missing her doses or poor compliancy as she was at NH. Enfd organ damage evident in IONA on CKD.   - REcs to reduce BP by 25% weekly now the BP imporoved but avoid bringing BP to goal to quickly  - decrease Hydralazine to 25 mg TID.  -Carvedilol increase to 12.5 mg q BIB, increase Isosorbide Mononitrate to 60 mg q D, and continue Amlodipine at same dose and frequency

## 2017-10-31 NOTE — PLAN OF CARE
Problem: Patient Care Overview  Goal: Plan of Care Review  Outcome: Outcome(s) achieved Date Met: 10/31/17  Pt free of falls/traumas/injuries. Per family at bedside, denies c/o SOB, CP, or discomfort. BP controlled throughout shift with PO antihypertensives; hydralazine decreased to 25 mg TID. Urine output decreased, 24 hour urine collect ordered. BUN/Cr 36/3.7, Nephrology following, working up for Nephrotic syndrome. VSS, NAD noted. Daughter at bedside. Will continue to monitor.

## 2017-10-31 NOTE — PLAN OF CARE
Problem: Physical Therapy Goal  Goal: Physical Therapy Goal  Goals to be met by: 11/10/17    Patient will increase functional independence with mobility by performin. Supine to sit with Stand-by Assistance  2. Sit to supine with Stand-by Assistance  3. Sit to stand transfer with Stand-by Assistance with RW.   4. Gait  x 50 feet with Contact guard Assistance using Rolling Walker.   5. Lower extremity exercise program x20 reps per handout, with assistance as needed     Outcome: Ongoing (interventions implemented as appropriate)    Pt would benefit from SNF in [place] upon discharge. Goals remain appropriate.  Appropriate to transfer with: min A of 1 person  Sasha Isaac PT, DPT  10/31/2017  Pager: [phone]

## 2017-10-31 NOTE — PT/OT/SLP PROGRESS
Occupational Therapy  Treatment    Elly Lal   MRN: [id number]   Admitting Diagnosis: Hypertensive emergency    OT Date of Treatment: 10/31/17   OT Start Time: 0930  OT Stop Time: 1000  OT Total Time (min): 30 min    Billable Minutes:  Self Care/Home Management 25    General Precautions: Standard, fall  Orthopedic Precautions: N/A        Do you have any cultural, spiritual, Taoism conflicts, given your current situation?: unknown     Subjective:  Communicated with nsg prior to session.  Pt reports dizziness with standing first trial.    Pain/Comfort  Pain Rating 1: 0/10    Objective:   Pt found seated in b/s chair with fly present. Fly member speaks English and [place] and denied OT calling for . Fly requesting to translate to patient.      Functional Mobility:    Transfers:   Sit <> Stand Assistance: Contact Guard Assistance  Sit <> Stand Assistive Device: Rolling Walker  Toilet Transfer Assistance: Contact Guard Assistance  Toilet Transfer Assistive Device: bedside commode    2 trials of standing competed. First trials with MIN A and tolerated stand x 1 min and returned sitting in b/s chair due to dizziness.  Second trial of standing from b/s chair, pt completed with CGA as she following instructions for correct hand placement and walker use. Pt with less dizziness.  Pt proceeded to walk with RW x 25 feet but urinated on floor. Pt then t/f to BSC to complete toileting and then returned to chair with CGA.    Activities of Daily Living:       UE Dressing Level of Assistance: Minimum assistance  LE Dressing Level of Assistance: Total assistance  Grooming Position: Seated  Grooming Level of Assistance: Stand by assistance  Toileting Where Assessed: Bedside commode  Toileting Level of Assistance: Moderate assistance       Education provided to pt/fly re: OT POC, safety with functional mobility/ADL skills including safe walker sara't and sit<>stand transitions. Pt/fly verb understanding.       AM-PAC 6  "CLICK ADL   How much help from another person does this patient currently need?   1 = Unable, Total/Dependent Assistance  2 = A lot, Maximum/Moderate Assistance  3 = A little, Minimum/Contact Guard/Supervision  4 = None, Modified [place]/Independent    Putting on and taking off regular lower body clothing? : 2  Bathing (including washing, rinsing, drying)?: 2  Toileting, which includes using toilet, bedpan, or urinal? : 2  Putting on and taking off regular upper body clothing?: 3  Taking care of personal grooming such as brushing teeth?: 3  Eating meals?: 4  Total Score: 16     AM-PAC Raw Score CMS "G-Code Modifier Level of Impairment Assistance   6 % Total / Unable   7 - 8 CM 80 - 100% Maximal Assist   9-13 CL 60 - 80% Moderate Assist   14 - 19 CK 40 - 60% Moderate Assist   20 - 22 CJ 20 - 40% Minimal Assist   23 CI 1-20% SBA / CGA   24 CH 0% Independent/ Mod I       Patient left up in chair with all lines intact, call button in reach and fly present    ASSESSMENT:  Elly Lal is a 89 y.o. female with a medical diagnosis of Hypertensive emergency and tolerated session well. Pt initially limited by dizziness which improved as mobility continued. Pt to benefit from cont OT to address stated goals. .    Rehab identified problem list/impairments: Rehab identified problem list/impairments: weakness, gait instability, decreased upper extremity function, impaired balance, impaired endurance, impaired self care skills, impaired functional mobilty    Rehab potential is good.    Activity tolerance: Good    Discharge recommendations:   Return to NH        GOALS:    Occupational Therapy Goals        Problem: Occupational Therapy Goal    Goal Priority Disciplines Outcome Interventions   Occupational Therapy Goal     OT, PT/OT Ongoing (interventions implemented as appropriate)    Description:  Goals to be met by: 7 days (11/6/2017)    Patient will increase functional independence with ADLs by performing:    Feeding " with [place].  UE Dressing with Set-up Assistance.  LE Dressing with Set-up Assistance.  Grooming while seated EOB with Set-up Assistance.  Toileting from bedside commode with Supervision for hygiene and clothing management.   Rolling to Bilateral with Modified [place].   Supine to sit with Modified [place].  Stand pivot transfers with Supervision.  Toilet transfer to bedside commode with Supervision.                      Plan:  Patient to be seen 3 x/week to address the above listed problems via self-care/home management, therapeutic activities, therapeutic exercises  Plan of Care expires: 11/29/17  Plan of Care reviewed with: patient, family         VINCE Coulter  10/31/2017

## 2017-10-31 NOTE — SUBJECTIVE & OBJECTIVE
Interval History:   Patient evaluated at bedside, with hypertension, no respiratory distress, serum creatinine has been stable compared with yesterday,     Review of patient's allergies indicates:  No Known Allergies  Current Facility-Administered Medications   Medication Frequency    acetaminophen tablet 650 mg Q4H PRN    albuterol-ipratropium 2.5mg-0.5mg/3mL nebulizer solution 3 mL Q4H PRN    amLODIPine tablet 10 mg Daily    ascorbic acid (vitamin C) tablet 500 mg Daily    calcitRIOL capsule 0.5 mcg Daily    carvedilol tablet 12.5 mg BID    citalopram tablet 20 mg Daily    dicyclomine capsule 10 mg Daily    donepezil tablet 10 mg QHS    ergocalciferol capsule 50,000 Units Q7 Days    ferrous gluconate tablet 324 mg TID WM    folic acid tablet 1 mg Daily    heparin (porcine) injection 5,000 Units Q8H    hydrALAZINE tablet 50 mg Q8H    [START ON 11/1/2017] isosorbide mononitrate 24 hr tablet 60 mg Daily    magnesium sulfate 2g in water 50mL IVPB (premix) PRN    magnesium sulfate 2g in water 50mL IVPB (premix) PRN    meclizine tablet 12.5 mg TID PRN    mirtazapine tablet 15 mg QHS    ondansetron injection 8 mg Q8H PRN    polyethylene glycol packet 17 g Daily    rosuvastatin tablet 10 mg QHS    senna-docusate 8.6-50 mg per tablet 1 tablet Daily    sodium chloride 0.9% flush 3 mL Q8H    solifenacin tablet 5 mg Daily    traMADol tablet 50 mg Q6H PRN    triamcinolone acetonide 0.1% cream BID       Objective:     Vital Signs (Most Recent):  Temp: 98.8 °F (37.1 °C) (10/31/17 1212)  Pulse: 60 (10/31/17 1212)  Resp: 18 (10/31/17 1212)  BP: (!) 113/57 (10/31/17 1212)  SpO2: (!) 94 % (10/31/17 1212)  O2 Device (Oxygen Therapy): room air (10/31/17 0730) Vital Signs (24h Range):  Temp:  [97.9 °F (36.6 °C)-98.8 °F (37.1 °C)] 98.8 °F (37.1 °C)  Pulse:  [56-64] 60  Resp:  [16-18] 18  SpO2:  [90 %-96 %] 94 %  BP: (113-185)/(56-75) 113/57     Weight: 73.4 kg (161 lb 13.1 oz) (10/31/17 0700)  Body mass index  is 29.6 kg/m².  Body surface area is 1.79 meters squared.    I/O last 3 completed shifts:  In: 940 [P.O.:940]  Out: 467 [Urine:466; Stool:1]    Physical Exam   Constitutional: She is oriented to person, place, and time. She appears well-developed and well-nourished. No distress.   HENT:   Head: Normocephalic and atraumatic.   Eyes: Conjunctivae are normal. Pupils are equal, round, and reactive to light.   Neck: Trachea normal and normal range of motion. Neck supple. No JVD present.   Cardiovascular: Normal rate, regular rhythm, S1 normal, S2 normal, intact distal pulses and normal pulses.  Exam reveals no gallop and no friction rub.    Murmur heard.  Pulmonary/Chest: Effort normal and breath sounds normal. She has no wheezes. She has no rales.   Abdominal: Soft. Bowel sounds are normal. She exhibits no distension. There is no tenderness.   Musculoskeletal: Normal range of motion. She exhibits no edema.   Neurological: She is alert and oriented to person, place, and time.   Skin: Skin is warm and dry. Capillary refill takes less than 2 seconds.   Psychiatric: She has a normal mood and affect. Her behavior is normal.   Vitals reviewed.      Significant Labs:  ABGs: No results for input(s): PH, PCO2, HCO3, POCSATURATED, BE in the last 168 hours.  BMP:   Recent Labs  Lab 10/31/17  0443   GLU 89      CO2 24   BUN 39*   CREATININE 3.7*   CALCIUM 8.2*   MG 2.4     CBC:   Recent Labs  Lab 10/31/17  0443   WBC 7.69   RBC 3.88*   HGB 10.6*   HCT 34.2*      MCV 88   MCH 27.3   MCHC 31.0*     CMP:   Recent Labs  Lab 10/28/17  0300  10/31/17  0443   GLU 86  < > 89   CALCIUM 8.3*  < > 8.2*   ALBUMIN 2.2*  --   --    PROT 5.9*  --   --      < > 137   K 3.7  < > 4.3   CO2 22*  < > 24     < > 105   BUN 30*  < > 39*   CREATININE 3.0*  < > 3.7*   ALKPHOS 56  --   --    ALT 6*  --   --    AST 14  --   --    BILITOT 0.3  --   --    < > = values in this interval not displayed.  All labs within the past 24 hours  have been reviewed.

## 2017-10-31 NOTE — DISCHARGE SUMMARY
"[facility] Hospital Medicine  Discharge Summary      Patient Name: Elly Lal  MRN: [id number]  Admission Date: 10/27/2017  Hospital Length of Stay: 4 days  Discharge Date and Time:  10/31/2017 1:46 PM  Attending Physician: Ron Romero, *   Discharging Provider: Sb Carmona MD  Primary Care Provider: Roel Muñoz MD    Hospital Medicine Team: [facility]OS[facility] MED 1 Sb Carmona MD    HPI: 89 F with HTN and CKD4 is transferred from nursing home on 10/27/17 for worsening HTN. History obtained from patient and family members.     Per family, her baseline BP is "normal" (120s-130s). For the past 2 weeks, patient has been having SBP in the 170s (early in the morning and throughout the day). A few days ago, SBP went up to the 190s, and her hydralazine 50 mg was increased from TID to QID 3 days ago. This morning, her SBP was 220. She resides in a nursing home and is compliant with her outpatient medication regimen, which includes hydralazine 50 mg QID, nebivolol 10 mg daily, and torsemide 20 daily, all most recently taken this morning prior to presentation. Reports eating salty foods. Per family, has had decreased PO intake for the past 2 weeks because she does not like the food at the nursing home. She has been seeing her outside doctor for HTN and kidney disease (unclear specific diagnosis) for the past few months. Reports urinary frequency. Denies decreased UOP, blood in urine, pain with urination.     She reports headache associated with her HTN for the past 2 week, described as a band wrapped around her head from the lower occipital area to forehead bilaterally. HA occurs 1-2 times daily, worst in the mornings and worse with any kind of movement/sitting upright, improved with lying down/still. No hx of chronic headaches or migraines. Associated sensation of room spinning. Denies n/v, light/sound sensitivity, vision changes, focal weakness, abnormal sensation, neck " "pain/stiffness, lightheadedness, syncope/LOC, fall. Per family, had delirium "was talking nonsense" a few days ago when she had an episode of UTI, which has since resolved. Has dementia at baseline (alzheimer's vs LBD, per neuro). Family denies acute changes in mental status.     ROS notable for abdominal distension, LLQ abdominal pain upon touch, and R sided torso pain upon touch. Has chronic back pain. Denies leg swelling, flank pain, diarrhea, blood in stool, flu-like sx.    ICU Course:  In the ED, found be hypertensive to 220-230/80-90 (calculated ) upon presentation (noon). Was given hydralazine 50 mg PO x 1 with no improvement: /80 at 4 pm. Was started on nicardipine drip at 4 pm with SBP down to 152/68 at 5:30 pm. Nicardipine drip was paused, followed by BP up to 205/110 at 6:30 pm. Nicardipine drip was resume afterwards.     Also found to have Cr 3.3 (previously 2.4 in 10/2016, 0.8-1.3 in 2015), BUN 32. Bradycardic to 57-62.  (previously 75-200s), troponin negative. EKG showed sinus bradycardia with 1st degree AV block and RBBB. CXR notable for chronic/stable tortuous, atherosclerotic thoracic aorta.     MICU was consulted for hypertensive emergency on nicardipine drip.     Hospital Course:  10/29/2017- Stepped down to hospital medicine. Still hypertensive overnight up to the 180's systolic, increased dose of hydralazine to 100MG TID. Nephrology consulted given patient's continuing renal dysfunction. Likely iATN, will be followed up as outpatient. Hypertensive medications titrated and patient's blood pressure much improved.     * No surgery found *          Consults:   Consults         Status Ordering Provider     Inpatient consult to Critical Care Medicine  Once     Provider:  (Not yet assigned)    Completed MARIBEL ALMANZA     Inpatient consult to Nephrology  Once     Provider:  (Not yet assigned)    Completed HEMANTH SINHA        Physical Exam   Constitutional: She is oriented " to person, place, and time. She appears well-developed and well-nourished. No distress.   HENT:   Head: Normocephalic and atraumatic.   Eyes: EOM are normal. No scleral icterus.   Neck: Normal range of motion. Neck supple. No JVD present.   Cardiovascular: Normal rate, regular rhythm and normal heart sounds.    No murmur heard.  Pulmonary/Chest: Effort normal and breath sounds normal. No respiratory distress. She has no wheezes.   Abdominal: Soft. Bowel sounds are normal. She exhibits no distension and no mass. There is no tenderness. There is no rebound and no guarding.   Colostomy, LLQ   Musculoskeletal: She exhibits no tenderness.   Neurological: She is alert and oriented to person, place, and time.   Skin: Skin is warm and dry. Capillary refill takes less than 2 seconds. She is not diaphoretic.   Psychiatric: She has a normal mood and affect. Her behavior is normal.   Vitals reviewed.        Final Active Diagnoses:    Diagnosis Date Noted POA    Other proteinuria [R80.8]  Unknown    Acute kidney injury superimposed on CKD [N17.9, N18.9] 10/27/2017 Yes    CKD (chronic kidney disease) stage 5, GFR less than 15 ml/min [N18.5] 10/27/2017 Yes    Anemia of chronic disease [D63.8] 10/27/2017 Yes    Depression [F32.9] 10/27/2017 Yes    Urinary incontinence [R32] 10/27/2017 Yes    Colostomy in place [Z93.3] 10/27/2017 Not Applicable    Seasonal allergies [J30.2] 10/27/2017 Yes    Dementia without behavioral disturbance [F03.90] 10/27/2017 Yes      Problems Resolved During this Admission:    Diagnosis Date Noted Date Resolved POA    PRINCIPAL PROBLEM:  Hypertensive emergency [I16.1] 10/27/2017 10/31/2017 Yes    Headache [R51] 10/27/2017 10/30/2017 Yes      Discharged Condition: fair    Follow Up: Nephrology     Patient Instructions:     Ambulatory Referral to Nephrology   Referral Priority: Routine Referral Type: Consultation   Referral Reason: Specialty Services Required    Requested Specialty: Nephrology     Number of Visits Requested: 1        Medications:  Reconciled Home Medications:   Current Discharge Medication List      START taking these medications    Details   amLODIPine (NORVASC) 10 MG tablet Take 1 tablet (10 mg total) by mouth once daily.  Qty: 30 tablet, Refills: 11      carvedilol (COREG) 12.5 MG tablet Take 1 tablet (12.5 mg total) by mouth 2 (two) times daily.  Qty: 60 tablet, Refills: 11      isosorbide mononitrate (IMDUR) 60 MG 24 hr tablet Take 1 tablet (60 mg total) by mouth once daily.  Qty: 30 tablet, Refills: 11         CONTINUE these medications which have NOT CHANGED    Details   acetaminophen (TYLENOL) 650 MG TbSR Take 650 mg by mouth every 6 (six) hours as needed (fever).      ascorbic acid (VITAMIN C) 500 MG tablet Take 500 mg by mouth once daily.      citalopram (CELEXA) 20 MG tablet Take 20 mg by mouth once daily.       cloNIDine 0.2 mg/24 hr td ptwk (CATAPRES) 0.2 mg/24 hr Place 1 patch onto the skin every Saturday.      dextran 70-hypromellose (TEARS) ophthalmic solution Place 1 drop into both eyes every 12 (twelve) hours as needed (dryness).      dicyclomine (BENTYL) 10 MG capsule Take 10 mg by mouth once daily.       donepezil (ARICEPT) 10 MG tablet Take 10 mg by mouth every evening.      epoetin melody 2,000 unit/mL Soln 1 mL, epoetin melody 3,000 unit/mL Soln 1 mL, epoetin melody 10,000 unit/mL Soln 1 mL Inject 15,000 Units into the vein every Thursday.       ergocalciferol (VITAMIN D2) 50,000 unit Cap Take 50,000 Units by mouth every Thursday.       ferrous sulfate 325 mg (65 mg iron) Tab tablet Take 325 mg by mouth 3 (three) times daily.      folic acid (FOLVITE) 1 MG tablet Take 1 mg by mouth once daily.      GUAIFENESIN/DEXTROMETHORPHAN (ROBITUSSIN-DM ORAL) Take 10 mLs by mouth every 4 (four) hours as needed (cough).      hydrALAZINE (APRESOLINE) 50 MG tablet Take 50 mg by mouth every 6 (six) hours.       lactulose (CHRONULAC) 10 gram/15 mL solution Take 20 g by mouth 2 (two) times  daily.       loratadine (CLARITIN) 10 mg tablet Take 10 mg by mouth once daily.      meclizine (ANTIVERT) 12.5 mg tablet Take 12.5 mg by mouth 3 (three) times daily as needed (motion sickness).       melatonin 3 mg Tab Take 1 tablet by mouth nightly as needed (insomnia).      mirtazapine (REMERON) 7.5 MG Tab Take 7.5 mg by mouth every evening.       ondansetron (ZOFRAN) 4 MG tablet Take 4 mg by mouth every 4 (four) hours as needed for Nausea.       polyethylene glycol (GLYCOLAX) 17 gram PwPk Take 17 g by mouth 2 (two) times daily.       ranitidine (ZANTAC) 150 MG tablet Take 150 mg by mouth nightly.      rosuvastatin (CRESTOR) 10 MG tablet Take 10 mg by mouth every evening.      solifenacin (VESICARE) 5 MG tablet Take 5 mg by mouth once daily.       traMADol (ULTRAM) 50 mg tablet Take 50 mg by mouth every 4 (four) hours as needed for Pain.       triamcinolone acetonide 0.1% (KENALOG) 0.1 % cream Apply topically 2 (two) times daily. To upper back for itchiness until resolved         STOP taking these medications       nebivolol (BYSTOLIC) 10 MG tablet Comments:   Reason for Stopping:         torsemide (DEMADEX) 20 MG Tab Comments:   Reason for Stopping:         calcitRIOL (ROCALTROL) 0.5 MCG Cap Comments:   Reason for Stopping:         ferrous gluconate (FERGON) 324 MG tablet Comments:   Reason for Stopping:               Significant Diagnostic Studies: Labs:   CMP     Recent Labs  Lab 10/30/17  0442 10/31/17  0443   * 137   K 4.1 4.3    105   CO2 22* 24   GLU 90 89   BUN 36* 39*   CREATININE 3.7* 3.7*   CALCIUM 8.2* 8.2*   ANIONGAP 9 8   ESTGFRAFRICA 11.9* 11.9*   EGFRNONAA 10.3* 10.3*    and CBC     Recent Labs  Lab 10/30/17  0442 10/31/17  0443   WBC 7.48 7.69   HGB 10.6* 10.6*   HCT 34.0* 34.2*    293       Pending Diagnostic Studies:     Procedure Component Value Units Date/Time    GENI [[id number]] Collected:  10/30/17 2018    Order Status:  Sent Lab Status:  In process Updated:  10/30/17 2022     Specimen:  Blood from Blood     Phospholipase A2 Receptor AB, Serum [[id number]] Collected:  10/30/17 2017    Order Status:  Sent Lab Status:  In process Updated:  10/30/17 2022    Specimen:  Blood         Indwelling Lines/Drains at time of discharge:   Lines/Drains/Airways     Drain                 Colostomy LUQ -- days         Colostomy 02/19/15 1211  days         Closed/Suction Drain 02/19/15 1509 Abdomen  984 days         Closed/Suction Drain 02/19/15 1509 Abdomen Bulb  984 days         Colostomy 03/08/15  days                Time spent on the discharge of patient: 30 minutes  Patient was seen and examined on the date of discharge and determined to be suitable for discharge.         Sb Carmona MD  Department of Hospital Medicine  [facility]

## 2017-10-31 NOTE — PLAN OF CARE
Problem: Occupational Therapy Goal  Goal: Occupational Therapy Goal  Goals to be met by: 7 days (11/6/2017)    Patient will increase functional independence with ADLs by performing:    Feeding with [place].  UE Dressing with Set-up Assistance.  LE Dressing with Set-up Assistance.  Grooming while seated EOB with Set-up Assistance.  Toileting from bedside commode with Supervision for hygiene and clothing management.   Rolling to Bilateral with Modified [place].   Supine to sit with Modified [place].  Stand pivot transfers with Supervision.  Toilet transfer to bedside commode with Supervision.     Goals remain appropriate.

## 2017-10-31 NOTE — PT/OT/SLP PROGRESS
Physical Therapy  Treatment    Elly Lal   MRN: [id number]   Admitting Diagnosis: Hypertensive emergency    PT Received On: 10/31/17  PT Start Time: 1108     PT Stop Time: 1122    PT Total Time (min): 14 min       Billable Minutes:  Gait [id number]    Treatment Type: Treatment  PT/PTA: PT       General Precautions: Standard, fall  Orthopedic Precautions: N/A   Braces: N/A    Subjective:  Communicated with RN prior to session.  Pt reports being sleepy.   Pt's family member provided translation along with  line  # [id number]    Pain/Comfort  Pain Rating 1: 0/10  Pain Rating Post-Intervention 1: 0/10    Objective:   Patient found with: telemetry    Functional Mobility:  Pt found in bedside chair, with family present.  Bed Mobility:    Pt found/returned to bedside chair    Transfers:    Sit <> Stand: x1 trial from bedside chair with Contact Guard Assistance with Rolling Walker    Stand <> Sit: x1 trial to bedside chair with  Contact Guard Assistance with Rolling Walker   Comments: pt with kyphotic posture, requires increased time    Gait:   Distance Ambulated: 40ft    Assistance level: Contact Guard Assistance and Minimal Assistance   Assistive Device used:  Rolling Walker   Gait Pattern: 3-point gait   Gait Deviation(s): decreased wei, decreased step length and decreased stride length   Impairments due to: decreased strength and decreased endurance   Comments: PT provided constant verbal/tactile cues to keep RW closer to maintain JAZMIN. Pt pushing RW to far forward.    Therapeutic Activities & Exercises:   Seated in bedside chair: Pt performed BLE 10 repetitions with facilitation for correct performance and sequencing. Exercises performed to develop and maintain pt's  strength and endurance.     Education:  Patient provided with daily orientation and goals of this PT session. Patient and family agreed to participate in session.Patient and family aware of patient's deficits and therapy progression.  They were educated to transfer with RN/PCT only; min a of 1 person. Time provided for therapeutic counseling and discussion of health disposition. All questions answered to patient's and family's satisfaction, within scope of PT practice; voiced no other concerns. White board updated in patient's room, RN notified of session.    Patient left up in chair, with head in midline, neutral pelvis & heels floated for skin protection with all lines intact, call button in reach, RN notified and family present    AM-PAC 6 CLICK MOBILITY  How much help from another person does this patient currently need?   1 = Unable, Total/Dependent Assistance  2 = A lot, Maximum/Moderate Assistance  3 = A little, Minimum/Contact Guard/Supervision  4 = None, Modified [place]/Independent    Turning over in bed (including adjusting bedclothes, sheets and blankets)?: 3  Sitting down on and standing up from a chair with arms (e.g., wheelchair, bedside commode, etc.): 3  Moving from lying on back to sitting on the side of the bed?: 3  Moving to and from a bed to a chair (including a wheelchair)?: 3  Need to walk in hospital room?: 3  Climbing 3-5 steps with a railing?: 2  Total Score: 17    AM-PAC Raw Score CMS G-Code Modifier Level of Impairment Assistance   6 % Total / Unable   7 - 9 CM 80 - 100% Maximal Assist   10 - 14 CL 60 - 80% Moderate Assist   15 - 19 CK 40 - 60% Moderate Assist   20 - 22 CJ 20 - 40% Minimal Assist   23 CI 1-20% SBA / CGA   24 CH 0% Independent/ Mod I     Assessment:  Elly Lal is a 89 y.o. female with a medical diagnosis of Hypertensive emergency. Patient is making progress towards goals, participating well in this session. Pt continues to require significant assist with endurance training. Pt with noted improvement with mobility. Ms. Gutierrez's  deficits affect their ability to safely and independently participate in self-care tasks and functional mobility. Due to her physical therapy diagnosis of debility  and deconditioning, they continue to benefit from acute PT services to address the following limitations: high fall risk, weakness, instability, the need for supervised instructions of exercise, and the decreased ability to perform functional activities which they were able to complete PTA. Education was provided to patient & family regarding importance of continued participation in therapy, patient's progress and discharge disposition. Pt would benefit from SNF upon discharge to improve quality of life and focus on recovery of impairments.     Rehab identified problem list/impairments: Rehab identified problem list/impairments: weakness, impaired endurance, impaired self care skills, impaired functional mobilty, gait instability, impaired balance, decreased safety awareness    Rehab potential is good.    Activity tolerance: Good    Discharge recommendations: Discharge Facility/Level Of Care Needs: nursing facility, skilled     Barriers to discharge: Barriers to Discharge: None    Equipment recommendations: Equipment Needed After Discharge: none     GOALS:    Physical Therapy Goals        Problem: Physical Therapy Goal    Goal Priority Disciplines Outcome Goal Variances Interventions   Physical Therapy Goal     PT/OT, PT Ongoing (interventions implemented as appropriate)     Description:  Goals to be met by: 11/10/17    Patient will increase functional independence with mobility by performin. Supine to sit with Stand-by Assistance  2. Sit to supine with Stand-by Assistance  3. Sit to stand transfer with Stand-by Assistance with RW.   4. Gait  x 50 feet with Contact guard Assistance using Rolling Walker.   5. Lower extremity exercise program x20 reps per handout, with assistance as needed                      PLAN:    Patient to be seen 3 x/week  to address the above listed problems via gait training, therapeutic activities, therapeutic exercises, neuromuscular re-education  Plan of Care expires:  11/28/17  Plan of Care reviewed with: patient, family    Sasha PIPE Isaac PT, DPT  10/31/2017   Pager: [phone]

## 2017-10-31 NOTE — NURSING
Dr. Carmona page to speak with daughter about dischgarging back to NH daughter  Dana at bedside. nursing home orders complete awaiting CM to set up transportation.

## 2017-10-31 NOTE — PROGRESS NOTES
[facility]  Nephrology  Progress Note    Patient Name: Elly Lal  MRN: [id number]  Admission Date: 10/27/2017  Hospital Length of Stay: 4 days  Attending Provider: Ron Romero, *   Primary Care Physician: Roel Muñoz MD  Principal Problem:Hypertensive emergency    Subjective:     HPI: Elly Lal is a pleasant 89 y.o.  lady from [place] with a PMHX relevant for HTN, HLD, Depression, CKD4 and a colostomy for several years that is not a candidate for being reverse who was transferred from nursing home on 10/27/17 for worsening HTN admitted to  for malignant HTN with HTN Emergency placed on Nicardipine gtt. She is Sinhala speaking only. She has a Hx for CKD followed by a Nephrologist Dr. Jackman Phone # [phone]. 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 During Hospital course it was noted that on transition to PO meds had dramtic decrease in BP from 190 down to 130 SBP in < 24 hrs. Her SBP improved and was brought down in 24-48 hrs. She presented with sCr.of 3.3 improved to 3.0 and then has steadily been rising to 3.3-3.7 today. She is making urine unclear total amount. UA with noevidence of Blood in Urine except for 2 UA's in past that had infectious process concomitantly.     Interval History:   Patient evaluated at bedside, with hypertension, no respiratory distress, serum creatinine has been stable compared with yesterday,     Review of patient's allergies indicates:  No Known Allergies  Current Facility-Administered Medications   Medication Frequency    acetaminophen tablet 650 mg Q4H PRN    albuterol-ipratropium 2.5mg-0.5mg/3mL nebulizer solution 3 mL Q4H PRN    amLODIPine tablet 10 mg Daily    ascorbic acid (vitamin C) tablet 500 mg Daily    calcitRIOL capsule 0.5 mcg Daily    carvedilol tablet 12.5 mg BID    citalopram tablet 20 mg Daily    dicyclomine capsule 10 mg Daily    donepezil tablet 10 mg QHS    ergocalciferol  capsule 50,000 Units Q7 Days    ferrous gluconate tablet 324 mg TID WM    folic acid tablet 1 mg Daily    heparin (porcine) injection 5,000 Units Q8H    hydrALAZINE tablet 50 mg Q8H    [START ON 11/1/2017] isosorbide mononitrate 24 hr tablet 60 mg Daily    magnesium sulfate 2g in water 50mL IVPB (premix) PRN    magnesium sulfate 2g in water 50mL IVPB (premix) PRN    meclizine tablet 12.5 mg TID PRN    mirtazapine tablet 15 mg QHS    ondansetron injection 8 mg Q8H PRN    polyethylene glycol packet 17 g Daily    rosuvastatin tablet 10 mg QHS    senna-docusate 8.6-50 mg per tablet 1 tablet Daily    sodium chloride 0.9% flush 3 mL Q8H    solifenacin tablet 5 mg Daily    traMADol tablet 50 mg Q6H PRN    triamcinolone acetonide 0.1% cream BID       Objective:     Vital Signs (Most Recent):  Temp: 98.8 °F (37.1 °C) (10/31/17 1212)  Pulse: 60 (10/31/17 1212)  Resp: 18 (10/31/17 1212)  BP: (!) 113/57 (10/31/17 1212)  SpO2: (!) 94 % (10/31/17 1212)  O2 Device (Oxygen Therapy): room air (10/31/17 0730) Vital Signs (24h Range):  Temp:  [97.9 °F (36.6 °C)-98.8 °F (37.1 °C)] 98.8 °F (37.1 °C)  Pulse:  [56-64] 60  Resp:  [16-18] 18  SpO2:  [90 %-96 %] 94 %  BP: (113-185)/(56-75) 113/57     Weight: 73.4 kg (161 lb 13.1 oz) (10/31/17 0700)  Body mass index is 29.6 kg/m².  Body surface area is 1.79 meters squared.    I/O last 3 completed shifts:  In: 940 [P.O.:940]  Out: 467 [Urine:466; Stool:1]    Physical Exam   Constitutional: She is oriented to person, place, and time. She appears well-developed and well-nourished. No distress.   HENT:   Head: Normocephalic and atraumatic.   Eyes: Conjunctivae are normal. Pupils are equal, round, and reactive to light.   Neck: Trachea normal and normal range of motion. Neck supple. No JVD present.   Cardiovascular: Normal rate, regular rhythm, S1 normal, S2 normal, intact distal pulses and normal pulses.  Exam reveals no gallop and no friction rub.    Murmur  heard.  Pulmonary/Chest: Effort normal and breath sounds normal. She has no wheezes. She has no rales.   Abdominal: Soft. Bowel sounds are normal. She exhibits no distension. There is no tenderness.   Musculoskeletal: Normal range of motion. She exhibits no edema.   Neurological: She is alert and oriented to person, place, and time.   Skin: Skin is warm and dry. Capillary refill takes less than 2 seconds.   Psychiatric: She has a normal mood and affect. Her behavior is normal.   Vitals reviewed.      Significant Labs:  ABGs: No results for input(s): PH, PCO2, HCO3, POCSATURATED, BE in the last 168 hours.  BMP:   Recent Labs  Lab 10/31/17  0443   GLU 89      CO2 24   BUN 39*   CREATININE 3.7*   CALCIUM 8.2*   MG 2.4     CBC:   Recent Labs  Lab 10/31/17  0443   WBC 7.69   RBC 3.88*   HGB 10.6*   HCT 34.2*      MCV 88   MCH 27.3   MCHC 31.0*     CMP:   Recent Labs  Lab 10/28/17  0300  10/31/17  0443   GLU 86  < > 89   CALCIUM 8.3*  < > 8.2*   ALBUMIN 2.2*  --   --    PROT 5.9*  --   --      < > 137   K 3.7  < > 4.3   CO2 22*  < > 24     < > 105   BUN 30*  < > 39*   CREATININE 3.0*  < > 3.7*   ALKPHOS 56  --   --    ALT 6*  --   --    AST 14  --   --    BILITOT 0.3  --   --    < > = values in this interval not displayed.  All labs within the past 24 hours have been reviewed.       Assessment/Plan:     * Hypertensive emergency    Unclear trigger where it was missing her doses or poor compliancy as she was at NH. Enfd organ damage evident in IONA on CKD.   - REcs to reduce BP by 25% weekly now the BP imporoved but avoid bringing BP to goal to quickly  - decrease Hydralazine to 25 mg TID.  -Carvedilol increase to 12.5 mg q BIB, increase Isosorbide Mononitrate to 60 mg q D, and continue Amlodipine at same dose and frequency           Acute kidney injury superimposed on CKD    IONA non oliguric secondary to iATN from rapid drop in BP. CKD unclear baseline sCr have a sCr of 2.4 10/2017 2 weeks prior  hospitalization. Suspect CKD stage 4. But need records from Nephrologist.     · Magda compatible with ATN  · UPCR 12 gms, wrights stain neg with evidence of such high - out of proportion Nephrotic range proteinuria will evaluate for Nephrotic Syndrome. Will check for SPEP, UPEP, Hep panel, Free light chains, GENI, acute hep panel and PLA2R  · Renal/retroperitoneal USG with evidence of CKD no obstructions  · Urine sediment with Hyaline cast and Granular cast compatible with IONA on CKD suspected iATN  · Monitor Ins and Outs and daily weights.   · Avoid nephrotoxic agents such as NSAIDS, Gadolinium and IV Radiocontrast  · Renal Dose meds to current GFR/Creat Clereance.  · Will need follow up with us in clinic 1-2 weeks for future need of Hd, referral for TOPS and venous mapping for evaluation of vascular surgery.   · Follow renal diet low sodium, low potassium and low phosphate                 Hussein Howard  Nephrology  Fellow  [facility]    Pager [phone]    ATTENDING PHYSICIAN ATTESTATION  I have personally interviewed and examined the patient. I thoroughly reviewed the demographic, clinical, laboratorial and imaging information available in medical records. I agree with the assessment and recommendations provided by the subspecialty resident. Dr. Lee was under my supervision.

## 2017-10-31 NOTE — PLAN OF CARE
Pt accepted back to [facility] per [facility]. SW awaiting call from facility with call report information.     SW will arranged wheelchair transport with [email or web address] [id number] with an estimated  time of 5:15pm

## 2017-10-31 NOTE — NURSING
DNR FORM: NOT SIGNED BY ATTENDING. DR. CRUMP NOTIFIED, STATED THAT HE WOULD NOTIFY STAFF THAT SHE SOUL SIGN DURING ROUNDS

## 2017-10-31 NOTE — CONSULTS
[facility]  Nephrology  Consult Note    Patient Name: Elly Lal  MRN: [id number]  Admission Date: 10/27/2017  Hospital Length of Stay: 3 days  Attending Provider: Ron Romero, *   Primary Care Physician: Roel Muñoz MD  Principal Problem:Hypertensive emergency    Consults  Subjective:     HPI: Elly Lal is a pleasant 89 y.o.  lady from [place] with a PMHX relevant for HTN, HLD, Depression, CKD4 and a colostomy for several years that is not a candidate for being reverse who was transferred from nursing home on 10/27/17 for worsening HTN admitted to  for malignant HTN with HTN Emergency placed on Nicardipine gtt. She is [place] speaking only. She has a Hx for CKD followed by a Nephrologist Dr. Jackman Phone # [phone]. 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 During Hospital course it was noted that on transition to PO meds had dramtic decrease in BP from 190 down to 130 SBP in < 24 hrs. Her SBP improved and was brought down in 24-48 hrs. She presented with sCr.of 3.3 improved to 3.0 and then has steadily been rising to 3.3-3.7 today. She is making urine unclear total amount. UA with noevidence of Blood in Urine except for 2 UA's in past that had infectious process concomitantly.     Past Medical History:   Diagnosis Date    Abdominal fistula     Acute renal failure syndrome     Anemia     Anemia     Arthritis     Bowel obstruction 2011    Bowel obstruction     Dementia     Depression     Diverticulitis, colon     Dysphagia     GERD (gastroesophageal reflux disease)     Hernia, abdominal     Hyperlipidemia     Hypertension     Hypertension primary    Hypokalemia     Insomnia     Seasonal allergies        Past Surgical History:   Procedure Laterality Date    COLON SURGERY      alberto's, with hysterectomy    COLOSTOMY         Review of patient's allergies indicates:  No Known Allergies  Current Facility-Administered  Medications   Medication Frequency    acetaminophen tablet 650 mg Q4H PRN    albuterol-ipratropium 2.5mg-0.5mg/3mL nebulizer solution 3 mL Q4H PRN    amLODIPine tablet 10 mg Daily    ascorbic acid (vitamin C) tablet 500 mg Daily    calcitRIOL capsule 0.5 mcg Daily    carvedilol tablet 6.25 mg BID    citalopram tablet 20 mg Daily    dicyclomine capsule 10 mg Daily    donepezil tablet 10 mg QHS    ergocalciferol capsule 50,000 Units Q7 Days    ferrous gluconate tablet 324 mg TID WM    folic acid tablet 1 mg Daily    heparin (porcine) injection 5,000 Units Q8H    hydrALAZINE tablet 100 mg Q8H    isosorbide mononitrate 24 hr tablet 30 mg Daily    magnesium sulfate 2g in water 50mL IVPB (premix) PRN    magnesium sulfate 2g in water 50mL IVPB (premix) PRN    meclizine tablet 12.5 mg TID PRN    mirtazapine tablet 15 mg QHS    ondansetron injection 8 mg Q8H PRN    polyethylene glycol packet 17 g Daily    rosuvastatin tablet 10 mg QHS    senna-docusate 8.6-50 mg per tablet 1 tablet Daily    sodium chloride 0.9% flush 3 mL Q8H    solifenacin tablet 5 mg Daily    traMADol tablet 50 mg Q6H PRN    triamcinolone acetonide 0.1% cream BID     Family History     None        Social History Main Topics    Smoking status: Never Smoker    Smokeless tobacco: Never Used    Alcohol use No    Drug use: No    Sexual activity: Not Currently     Review of Systems   Constitutional: Positive for appetite change and fatigue. Negative for fever and unexpected weight change.   HENT: Positive for tinnitus. Negative for facial swelling and hearing loss.    Eyes: Negative for visual disturbance.   Respiratory: Negative for chest tightness and shortness of breath.    Cardiovascular: Negative for chest pain and leg swelling.   Gastrointestinal: Negative for abdominal pain and nausea.   Genitourinary: Negative for difficulty urinating, dysuria and flank pain.   Musculoskeletal: Negative for arthralgias and myalgias.    Skin: Negative for color change.   Neurological: Positive for weakness, light-headedness and headaches. Negative for dizziness and syncope.   Psychiatric/Behavioral: Positive for confusion. Negative for behavioral problems and hallucinations.     Objective:     Vital Signs (Most Recent):  Temp: 97.9 °F (36.6 °C) (10/30/17 1626)  Pulse: (!) 59 (10/30/17 1626)  Resp: 16 (10/30/17 1626)  BP: (!) 124/57 (10/30/17 1626)  SpO2: (!) 91 % (10/30/17 1626)  O2 Device (Oxygen Therapy): room air (10/30/17 1450) Vital Signs (24h Range):  Temp:  [97.9 °F (36.6 °C)-99.4 °F (37.4 °C)] 97.9 °F (36.6 °C)  Pulse:  [51-62] 59  Resp:  [16-18] 16  SpO2:  [90 %-99 %] 91 %  BP: (124-165)/() 124/57     Weight: 73.5 kg (162 lb 0.6 oz) (10/30/17 0700)  Body mass index is 29.64 kg/m².  Body surface area is 1.79 meters squared.    I/O last 3 completed shifts:  In: 900 [P.O.:900]  Out: 225 [Urine:225]    Physical Exam   Constitutional: She is oriented to person, place, and time. She appears well-developed and well-nourished. No distress.   HENT:   Head: Normocephalic and atraumatic.   Eyes: Conjunctivae are normal. Pupils are equal, round, and reactive to light.   Neck: Trachea normal and normal range of motion. Neck supple. No JVD present.   Cardiovascular: Normal rate, regular rhythm, S1 normal, S2 normal, intact distal pulses and normal pulses.  Exam reveals no gallop and no friction rub.    Murmur heard.  Pulmonary/Chest: Effort normal and breath sounds normal. She has no wheezes. She has no rales.   Abdominal: Soft. Bowel sounds are normal. She exhibits no distension. There is no tenderness.   Musculoskeletal: Normal range of motion. She exhibits no edema.   Neurological: She is alert and oriented to person, place, and time.   Skin: Skin is warm and dry. Capillary refill takes less than 2 seconds.   Psychiatric: She has a normal mood and affect. Her behavior is normal.   Vitals reviewed.      Significant Labs:  BMP:   Recent  Labs  Lab 10/29/17  0757 10/30/17  0442   GLU  --  90   CL  --  104   CO2  --  22*   BUN  --  36*   CREATININE  --  3.7*   CALCIUM  --  8.2*   MG 2.1  --      Cardiac Markers: No results for input(s): CKMB, TROPONINT, MYOGLOBIN in the last 168 hours.  CBC:   Recent Labs  Lab 10/30/17  0442   WBC 7.48   RBC 3.83*   HGB 10.6*   HCT 34.0*      MCV 89   MCH 27.7   MCHC 31.2*     CMP:   Recent Labs  Lab 10/28/17  0300  10/30/17  0442   GLU 86  < > 90   CALCIUM 8.3*  < > 8.2*   ALBUMIN 2.2*  --   --    PROT 5.9*  --   --      < > 135*   K 3.7  < > 4.1   CO2 22*  < > 22*     < > 104   BUN 30*  < > 36*   CREATININE 3.0*  < > 3.7*   ALKPHOS 56  --   --    ALT 6*  --   --    AST 14  --   --    BILITOT 0.3  --   --    < > = values in this interval not displayed.  Coagulation: No results for input(s): INR, APTT in the last 168 hours.    Invalid input(s): PT  LFTs:   Recent Labs  Lab 10/28/17  0300   ALT 6*   AST 14   ALKPHOS 56   BILITOT 0.3   PROT 5.9*   ALBUMIN 2.2*       Recent Labs  Lab 10/29/17  2014   COLORU Yellow   SPECGRAV 1.015   PHUR 5.0   PROTEINUA 3+*   BACTERIA Occasional   NITRITE Negative   LEUKOCYTESUR Negative   UROBILINOGEN Negative   HYALINECASTS 0     All labs within the past 24 hours have been reviewed.    Significant Imaging:  Labs: Reviewed  ECG: Reviewed  X-Ray: Reviewed    Assessment/Plan:     * Hypertensive emergency    Unclear trigger where it was missing her doses or poor compliancy as she was at NH. Enfd organ damage evident in IONA on CKD.   - REcs to reduce BP by 25% weekly now the BP imporoved but avoid bringing BP to goal to quickly  - decrease Hydralazine to 25 mg TID.  - Continue Carvedilol, Isosorbide Mononitrate, and Amlodipine          Acute kidney injury superimposed on CKD    IONA non oliguric secondary to iATN from rapid drop in BP. CKD unclear baseline sCr have a sCr of 2.4 10/2017 2 weeks prior hospitalization. Suspect CKD stage 4. But need records from Nephrologist.      · Magda compatible with ATN  · UPCR 12 gms, wrights stain neg with evidence of such high - out of proportion Nephrotic range proteinuria will evaluate for Nephrotic Syndrome. Will check for SPEP, UPEP, Hep panel, Free light chains, C3 C4 GENI, acute hep panel and PLA2R  · Renal/retroperitoneal USG with evidence of CKD no obstructions  · Urine sediment with Hyaline cast and Granular cast compatible with IONA on CKD suspected iATN  · Monitor Ins and Outs and daily weights.   · Avoid nephrotoxic agents such as NSAIDS, Gadolinium and IV Radiocontrast  · Renal Dose meds to current GFR/Creat Clereance.  · Will attempt to contact Dr. Jackman Ph # [phone] her Nephrologist. Per family members she has been followed by nephrology x 1 year.   · Will follow with you.  · Thank you very much for you consult            CKD (chronic kidney disease) stage 5, GFR less than 15 ml/min    - See above # 1            Thank you for your consult. I will follow-up with patient. Please contact us if you have any additional questions.    Joseph Tapia MD  Nephrology  [facility]    ATTENDING PHYSICIAN ATTESTATION  I have personally interviewed and examined the patient. I thoroughly reviewed the demographic, clinical, laboratorial and imaging information available in medical records. I agree with the assessment and recommendations provided by the subspecialty resident. Dr. Tapia was under my supervision.

## 2017-10-31 NOTE — PLAN OF CARE
Layne with [facility] provided call report information, attending nurse is to call [phone], [address], ask for nurse Q, pt going to room 111B.     Michael with [facility]'s transportation confirmed that they will pick pt up at 5:45pm.

## 2017-10-31 NOTE — ASSESSMENT & PLAN NOTE
IONA non oliguric secondary to iATN from rapid drop in BP. CKD unclear baseline sCr have a sCr of 2.4 10/2017 2 weeks prior hospitalization. Suspect CKD stage 4. But need records from Nephrologist.     · Magda compatible with ATN  · UPCR 12 gms, wrights stain neg with evidence of such high - out of proportion Nephrotic range proteinuria will evaluate for Nephrotic Syndrome. Will check for SPEP, UPEP, Hep panel, Free light chains, GENI, acute hep panel and PLA2R  · Renal/retroperitoneal USG with evidence of CKD no obstructions  · Urine sediment with Hyaline cast and Granular cast compatible with IONA on CKD suspected iATN  · Monitor Ins and Outs and daily weights.   · Avoid nephrotoxic agents such as NSAIDS, Gadolinium and IV Radiocontrast  · Renal Dose meds to current GFR/Creat Clereance.  · Will need follow up with us in clinic 1-2 weeks for future need of Hd, referral for TOPS and venous mapping for evaluation of vascular surgery.   · Follow renal diet low sodium, low potassium and low phosphate

## 2017-10-31 NOTE — PLAN OF CARE
[facility]     Department of Hospital Medicine     [address] [place] [address]     [phone] after hours  [phone] fax       NURSING HOME ORDERS    10/31/2017    Admit to Nursing Home:  Regular Bed      Diagnoses:  Active Hospital Problems    Diagnosis  POA    *Hypertensive emergency [I16.1]  Yes    Other proteinuria [R80.8]  Unknown    Acute kidney injury superimposed on CKD [N17.9, N18.9]  Yes    CKD (chronic kidney disease) stage 5, GFR less than 15 ml/min [N18.5]  Yes    Anemia of chronic disease [D63.8]  Yes    Depression [F32.9]  Yes    Urinary incontinence [R32]  Yes    Colostomy in place [Z93.3]  Not Applicable    Seasonal allergies [J30.2]  Yes    Dementia without behavioral disturbance [F03.90]  Yes      Resolved Hospital Problems    Diagnosis Date Resolved POA    Headache [R51] 10/30/2017 Yes       Patient is homebound due to:  Hypertensive emergency    Allergies:Review of patient's allergies indicates:  No Known Allergies    Vitals:  Once weekly       Diet: Regular    Acitivities:      - May use walker, cane, or self-propelled wheelchair    LABS:  Per facility protocol    Nursing Precautions:         - Fall precautions per nursing home protocol    MISCELLANEOUS CARE:              Colostomy Care:  Empty bag every shift and prn                                             Change and clean site every 48 hours                          Medications: Discontinue all previous medication orders, if any. See new list below.   Elly Lal   Home Medication Instructions RASHAUN:[id number]    Printed on:10/31/17 1309   Medication Information                      acetaminophen (TYLENOL) 650 MG TbSR  Take 650 mg by mouth every 6 (six) hours as needed (fever).             amLODIPine (NORVASC) 10 MG tablet  Take 1 tablet (10 mg total) by mouth once daily.             ascorbic acid (VITAMIN C) 500 MG tablet  Take 500 mg by mouth once daily.              carvedilol (COREG) 12.5 MG tablet  Take 1 tablet (12.5 mg total) by mouth 2 (two) times daily.             citalopram (CELEXA) 20 MG tablet  Take 20 mg by mouth once daily.              cloNIDine 0.2 mg/24 hr td ptwk (CATAPRES) 0.2 mg/24 hr  Place 1 patch onto the skin every Saturday.             dextran 70-hypromellose (TEARS) ophthalmic solution  Place 1 drop into both eyes every 12 (twelve) hours as needed (dryness).             dicyclomine (BENTYL) 10 MG capsule  Take 10 mg by mouth once daily.              donepezil (ARICEPT) 10 MG tablet  Take 10 mg by mouth every evening.             epoetin melody 2,000 unit/mL Soln 1 mL, epoetin melody 3,000 unit/mL Soln 1 mL, epoetin melody 10,000 unit/mL Soln 1 mL  Inject 15,000 Units into the vein every Thursday.              ergocalciferol (VITAMIN D2) 50,000 unit Cap  Take 50,000 Units by mouth every Thursday.              ferrous sulfate 325 mg (65 mg iron) Tab tablet  Take 325 mg by mouth 3 (three) times daily.             folic acid (FOLVITE) 1 MG tablet  Take 1 mg by mouth once daily.             GUAIFENESIN/DEXTROMETHORPHAN (ROBITUSSIN-DM ORAL)  Take 10 mLs by mouth every 4 (four) hours as needed (cough).             hydrALAZINE (APRESOLINE) 50 MG tablet  Take 50 mg by mouth every 6 (six) hours.              isosorbide mononitrate (IMDUR) 60 MG 24 hr tablet  Take 1 tablet (60 mg total) by mouth once daily.             lactulose (CHRONULAC) 10 gram/15 mL solution  Take 20 g by mouth 2 (two) times daily.              loratadine (CLARITIN) 10 mg tablet  Take 10 mg by mouth once daily.             meclizine (ANTIVERT) 12.5 mg tablet  Take 12.5 mg by mouth 3 (three) times daily as needed (motion sickness).              melatonin 3 mg Tab  Take 1 tablet by mouth nightly as needed (insomnia).             mirtazapine (REMERON) 7.5 MG Tab  Take 7.5 mg by mouth every evening.              ondansetron (ZOFRAN) 4 MG tablet  Take 4 mg by mouth every 4 (four) hours as needed for  Nausea.              polyethylene glycol (GLYCOLAX) 17 gram PwPk  Take 17 g by mouth 2 (two) times daily.              ranitidine (ZANTAC) 150 MG tablet  Take 150 mg by mouth nightly.             rosuvastatin (CRESTOR) 10 MG tablet  Take 10 mg by mouth every evening.             solifenacin (VESICARE) 5 MG tablet  Take 5 mg by mouth once daily.              traMADol (ULTRAM) 50 mg tablet  Take 50 mg by mouth every 4 (four) hours as needed for Pain.              triamcinolone acetonide 0.1% (KENALOG) 0.1 % cream  Apply topically 2 (two) times daily. To upper back for itchiness until resolved                       _________________________________  Sb Carmona MD  10/31/2017

## 2017-11-01 LAB
INTERPRETATION SERPL IFE-IMP: NORMAL
PATHOLOGIST INTERPRETATION IFE: NORMAL
PATHOLOGIST INTERPRETATION SPE: NORMAL

## 2017-11-01 NOTE — PT/OT/SLP DISCHARGE
Physical Therapy Discharge Summary    Elly Lal  MRN: [id number]   Hypertensive emergency   Patient Discharged from acute Physical Therapy on 17.  Please refer to prior PT noted date on 10/31/17 for functional status.     Assessment:   Patient appropriate for care in another setting. Patient has not met goals.  GOALS:    Physical Therapy Goals        Problem: Physical Therapy Goal    Goal Priority Disciplines Outcome Goal Variances Interventions   Physical Therapy Goal     PT/OT, PT Ongoing (interventions implemented as appropriate)     Description:  Goals to be met by: 11/10/17    Patient will increase functional independence with mobility by performin. Supine to sit with Stand-by Assistance  2. Sit to supine with Stand-by Assistance  3. Sit to stand transfer with Stand-by Assistance with RW.   4. Gait  x 50 feet with Contact guard Assistance using Rolling Walker.   5. Lower extremity exercise program x20 reps per handout, with assistance as needed                    Reasons for Discontinuation of Therapy Services  Transfer to alternate level of care.      Plan:  Patient Discharged to: Skilled Nursing Facility.  Sasha Isaac PT, DPT  2017  Pager: [phone]

## 2017-11-01 NOTE — PT/OT/SLP DISCHARGE
Occupational Therapy Discharge Summary    Elly Lal  MRN: [id number]   Hypertensive emergency   Patient Discharged from acute Occupational Therapy on 11/1/2017  .  Please refer to prior OT note dated on 10/31/17 for functional status.     Assessment:   Patient appropriate for care in another setting.  GOALS:    Occupational Therapy Goals        Problem: Occupational Therapy Goal    Goal Priority Disciplines Outcome Interventions   Occupational Therapy Goal     OT, PT/OT Ongoing (interventions implemented as appropriate)    Description:  Goals to be met by: 7 days (11/6/2017)    Patient will increase functional independence with ADLs by performing:    Feeding with [place].  UE Dressing with Set-up Assistance.  LE Dressing with Set-up Assistance.  Grooming while seated EOB with Set-up Assistance.  Toileting from bedside commode with Supervision for hygiene and clothing management.   Rolling to Bilateral with Modified [place].   Supine to sit with Modified [place].  Stand pivot transfers with Supervision.  Toilet transfer to bedside commode with Supervision.                  Goals not yet achieved upon hospital d/c.      Reasons for Discontinuation of Therapy Services  Transfer to alternate level of care.      Plan:  Patient Discharged to: pt to return to NH   VINCE Coulter  11/1/2017  .

## 2017-11-01 NOTE — NURSING
Discharged to [facility] in [place], [place]. Tele d/c'ed, sl d/c'ed with catheter intact. Daughter Dana at bedside, belongings and AVs given to daughter. Report called to Michael. Orders faxed and received. Transported via w/c. No distress noted.

## 2017-11-02 LAB
ANTI SM ANTIBODY: 1.17 EU
ANTI SM/RNP ANTIBODY: 2.31 EU
ANTI-SM INTERPRETATION: NEGATIVE
ANTI-SM/RNP INTERPRETATION: NEGATIVE
ANTI-SSA ANTIBODY: 5.7 EU
ANTI-SSA INTERPRETATION: NEGATIVE
ANTI-SSB ANTIBODY: 0.74 EU
ANTI-SSB INTERPRETATION: NEGATIVE
DSDNA AB SER-ACNC: NORMAL [IU]/ML

## 2017-11-03 LAB
PHOSPHOLIPASE A2 RECEPTOR, ELISA: <2 RU/ML
PHOSPHOLIPASE A2 RECEPTOR, IFA: NEGATIVE

## 2022-08-25 NOTE — PLAN OF CARE
Problem: Patient Care Overview  Goal: Plan of Care Review  Outcome: Revised  Pt free of falls/traumas/injuries. Denies c/o SOB, CP, or discomfort. Generalized skin remains CDI; trace edema noted to BLEs.  BP managed with scheduled PO antihypertensives; BP remains stable this shift. Colostomy dressing remains CDI; Miralax PO added this shift.  BUN/Cr steadily increasing; nephrology following.   PT/OT following.  Plan to continue monitoring and managing BP and renal function.  Grandson at the bedside. Pt and family tolerating plan of care.        OSMANY/Deena